# Patient Record
Sex: MALE | Race: WHITE | NOT HISPANIC OR LATINO | Employment: OTHER | URBAN - METROPOLITAN AREA
[De-identification: names, ages, dates, MRNs, and addresses within clinical notes are randomized per-mention and may not be internally consistent; named-entity substitution may affect disease eponyms.]

---

## 2020-01-03 ENCOUNTER — APPOINTMENT (OUTPATIENT)
Dept: RADIOLOGY | Facility: MEDICAL CENTER | Age: 20
End: 2020-01-03
Attending: EMERGENCY MEDICINE
Payer: MEDICARE

## 2020-01-03 ENCOUNTER — HOSPITAL ENCOUNTER (EMERGENCY)
Facility: MEDICAL CENTER | Age: 20
End: 2020-01-03
Attending: EMERGENCY MEDICINE
Payer: MEDICARE

## 2020-01-03 VITALS
HEIGHT: 71 IN | RESPIRATION RATE: 17 BRPM | SYSTOLIC BLOOD PRESSURE: 128 MMHG | TEMPERATURE: 98.8 F | WEIGHT: 123.46 LBS | OXYGEN SATURATION: 99 % | HEART RATE: 94 BPM | BODY MASS INDEX: 17.28 KG/M2 | DIASTOLIC BLOOD PRESSURE: 92 MMHG

## 2020-01-03 DIAGNOSIS — J02.8 PHARYNGITIS DUE TO OTHER ORGANISM: ICD-10-CM

## 2020-01-03 DIAGNOSIS — J03.90 TONSILLITIS: ICD-10-CM

## 2020-01-03 LAB
HETEROPH AB SER QL: NEGATIVE
S PYO DNA SPEC NAA+PROBE: NOT DETECTED

## 2020-01-03 PROCEDURE — 700117 HCHG RX CONTRAST REV CODE 255: Performed by: EMERGENCY MEDICINE

## 2020-01-03 PROCEDURE — 96375 TX/PRO/DX INJ NEW DRUG ADDON: CPT | Mod: XU

## 2020-01-03 PROCEDURE — 87651 STREP A DNA AMP PROBE: CPT

## 2020-01-03 PROCEDURE — 99284 EMERGENCY DEPT VISIT MOD MDM: CPT

## 2020-01-03 PROCEDURE — 87070 CULTURE OTHR SPECIMN AEROBIC: CPT

## 2020-01-03 PROCEDURE — 87081 CULTURE SCREEN ONLY: CPT

## 2020-01-03 PROCEDURE — 96365 THER/PROPH/DIAG IV INF INIT: CPT | Mod: XU

## 2020-01-03 PROCEDURE — 700105 HCHG RX REV CODE 258: Performed by: EMERGENCY MEDICINE

## 2020-01-03 PROCEDURE — 700111 HCHG RX REV CODE 636 W/ 250 OVERRIDE (IP): Performed by: EMERGENCY MEDICINE

## 2020-01-03 PROCEDURE — 86308 HETEROPHILE ANTIBODY SCREEN: CPT

## 2020-01-03 PROCEDURE — 70491 CT SOFT TISSUE NECK W/DYE: CPT

## 2020-01-03 RX ORDER — AMOXICILLIN 500 MG/1
1000 CAPSULE ORAL DAILY
Qty: 20 CAP | Refills: 0 | Status: SHIPPED | OUTPATIENT
Start: 2020-01-03 | End: 2020-01-13

## 2020-01-03 RX ORDER — KETOROLAC TROMETHAMINE 30 MG/ML
15 INJECTION, SOLUTION INTRAMUSCULAR; INTRAVENOUS ONCE
Status: COMPLETED | OUTPATIENT
Start: 2020-01-03 | End: 2020-01-03

## 2020-01-03 RX ORDER — LISDEXAMFETAMINE DIMESYLATE 60 MG/1
CAPSULE ORAL
Status: SHIPPED | COMMUNITY
End: 2021-10-12

## 2020-01-03 RX ORDER — DIVALPROEX SODIUM 500 MG/1
500 TABLET, DELAYED RELEASE ORAL DAILY
Status: SHIPPED | COMMUNITY
End: 2021-10-12

## 2020-01-03 RX ORDER — LEVOTHYROXINE SODIUM 88 UG/1
88 TABLET ORAL
Status: SHIPPED | COMMUNITY
End: 2021-10-12

## 2020-01-03 RX ORDER — DEXAMETHASONE SODIUM PHOSPHATE 4 MG/ML
8 INJECTION, SOLUTION INTRA-ARTICULAR; INTRALESIONAL; INTRAMUSCULAR; INTRAVENOUS; SOFT TISSUE ONCE
Status: COMPLETED | OUTPATIENT
Start: 2020-01-03 | End: 2020-01-03

## 2020-01-03 RX ORDER — SODIUM CHLORIDE 9 MG/ML
1000 INJECTION, SOLUTION INTRAVENOUS ONCE
Status: COMPLETED | OUTPATIENT
Start: 2020-01-03 | End: 2020-01-03

## 2020-01-03 RX ORDER — CLONIDINE HYDROCHLORIDE 0.1 MG/1
0.1 TABLET ORAL 2 TIMES DAILY
Status: SHIPPED | COMMUNITY
End: 2021-10-12

## 2020-01-03 RX ADMIN — DEXAMETHASONE SODIUM PHOSPHATE 8 MG: 4 INJECTION, SOLUTION INTRA-ARTICULAR; INTRALESIONAL; INTRAMUSCULAR; INTRAVENOUS; SOFT TISSUE at 12:52

## 2020-01-03 RX ADMIN — CEFTRIAXONE SODIUM 1 G: 1 INJECTION, POWDER, FOR SOLUTION INTRAMUSCULAR; INTRAVENOUS at 16:07

## 2020-01-03 RX ADMIN — IOHEXOL 75 ML: 350 INJECTION, SOLUTION INTRAVENOUS at 14:20

## 2020-01-03 RX ADMIN — SODIUM CHLORIDE 1000 ML: 9 INJECTION, SOLUTION INTRAVENOUS at 16:07

## 2020-01-03 RX ADMIN — KETOROLAC TROMETHAMINE 15 MG: 30 INJECTION, SOLUTION INTRAMUSCULAR at 12:52

## 2020-01-03 RX ADMIN — SODIUM CHLORIDE 1000 ML: 9 INJECTION, SOLUTION INTRAVENOUS at 12:51

## 2020-01-03 SDOH — HEALTH STABILITY: MENTAL HEALTH: HOW OFTEN DO YOU HAVE A DRINK CONTAINING ALCOHOL?: NOT ASKED

## 2020-01-03 ASSESSMENT — PAIN SCALES - WONG BAKER
WONGBAKER_NUMERICALRESPONSE: HURTS A LITTLE MORE
WONGBAKER_NUMERICALRESPONSE: HURTS A WHOLE LOT

## 2020-01-03 NOTE — ED PROVIDER NOTES
ED Provider Note    Scribed for Zen Davies D.O. by Dre Hoffman. 1/3/2020  12:14 PM    Primary care provider: None noted  Means of arrival: Private vehicle  History obtained from: Patient  History limited by: None    CHIEF COMPLAINT  Chief Complaint   Patient presents with   • Sent from Urgent Care     here to get r/o left peritonsillar abscess. also positive for influenza A   • Sore Throat     since sunday. also has painful swallowing       HPI  Chris Murillo is a 19 y.o. male who presents to the Emergency Department for evaluation of sore throat onset 5 days. He reports additional symptoms of difficulty swallowing, brown mucous production with a small amount of blood, but denies vomiting, or difficulty eating. [facility], who recommended he present to the ED to rule out possible peritonsillar abscesses. He was Flu A positive, but Strep negative at Urgent Care. He denies alleviating factors. He has history of ADHD and seizure disorder.     REVIEW OF SYSTEMS  Pertinent positives include difficulty swallowing, brown mucous production with a small amount of blood. Pertinent negatives include no vomiting, or difficulty eating. All other systems reviewed and negative.      PAST MEDICAL HISTORY  Past Medical History:   Diagnosis Date   • Psychiatric disorder     ADHD   • Seizure disorder (HCC)        SURGICAL HISTORY  History reviewed. No pertinent surgical history.     SOCIAL HISTORY  Social History     Tobacco Use   • Smoking status: Never Smoker   • Smokeless tobacco: Never Used   Substance Use Topics   • Alcohol use: Yes     Comment: rare   • Drug use: Yes     Comment: marijuana      Social History     Substance and Sexual Activity   Drug Use Yes    Comment: marijuana       FAMILY HISTORY  History reviewed. No pertinent family history.    CURRENT MEDICATIONS  Home Medications     Reviewed by Sharon Ray R.N. (Registered Nurse) on 01/03/20 at 1126  Med  "List Status: Complete   Medication Last Dose Status   artificial tears (EYE LUBRICANT) Ointment ophth ointment prn Active   cloNIDine (CATAPRES) 0.1 MG Tab taking Active   divalproex (DEPAKOTE) 500 MG Tablet Delayed Response taking Active   levothyroxine (SYNTHROID) 88 MCG Tab taking Active   Lisdexamfetamine Dimesylate (VYVANSE) 60 MG Cap taking Active                ALLERGIES  Allergies   Allergen Reactions   • Latex        PHYSICAL EXAM  VITAL SIGNS: /109   Pulse (!) 119   Temp 37.2 °C (99 °F) (Temporal)   Resp 18   Ht 1.803 m (5' 11\")   Wt 56 kg (123 lb 7.3 oz)   SpO2 100%   BMI 17.22 kg/m²   Nursing notes and vitals reviewed.  Constitutional: Well developed, Well nourished, No acute distress, Non-toxic appearance.   HENT: Significant peritonsillar edema and exudate, Pharyngeal erythema. Normocephalic, Atraumatic, tympanic membranes normal, moist mucous membranes, no rhinorrhea.  Eyes: PERRLA, EOMI, Conjunctiva normal, No discharge.   Cardiovascular: Tachycardic heart rate, Normal rhythm, No murmurs, No rubs, No gallops.   Thorax & Lungs: No respiratory distress, No rales, No rhonchi, No wheezing, No chest tenderness.   Abdomen: Bowel sounds normal, Soft, No tenderness, No guarding, No rebound, No masses, No pulsatile masses.   Skin: Warm, Dry, No erythema, No rash.   Musculoskeletal: Intact distal pulses, No edema, No cyanosis, No clubbing. Good range of motion in all major joints. No tenderness to palpation or major deformities noted, no CVA tenderness, no midline back tenderness.   Neurologic: Alert & oriented x 3, Normal motor function, Normal sensory function, No focal deficits noted.  Psychiatric: Affect normal for clinical presentation.     DIAGNOSTIC STUDIES/PROCEDURES    LABS  Results for orders placed or performed during the hospital encounter of 01/03/20   Group A Strep by PCR   Result Value Ref Range    Group A Strep by PCR Not Detected Not Detected   MONONUCLEOSIS TEST QUAL   Result " Value Ref Range    Heterophile Screen Negative Negative      All labs reviewed by me.    RADIOLOGY  CT-SOFT TISSUE NECK WITH   Final Result      1.  There is prominent tonsillar tissue with slightly heterogeneous enhancement suggesting tonsillitis but there is no focal abscess collection.   2.  There is bilateral neck lymphadenopathy, left greater than right, likely inflammatory and reactive.   3.  There is incidental underlying chronic sinus disease.        The radiologist's interpretation of all radiological studies have been reviewed by me.    COURSE & MEDICAL DECISION MAKING  Nursing notes, VS, PMSFHx reviewed in chart.    12:14 PM - Patient seen and examined at bedside. I informed the patient of my plan to run diagnostic studies to evaluate their symptoms including CT scan and labs. Patient verbalizes understanding and support with my plan of care.  Patient will be treated with NS Infusion 1,000 mL, Decadron 8 mg injection, Toradol 15 mg injection. Ordered CT-Soft Tissue Neck, Culture throat, Mononucleosis Test Qual, Group A Strep by PCR to evaluate his symptoms.      12:43 PM- Ordered Culture GC Screen     3:55 PM - The patient will be treated with NS Infusion 1,000 mL, and Rocephin 1 g in  mL IVPB.    HYDRATION: Based on the patient's presentation of Tachycardia the patient was given IV fluids. IV Hydration was used because oral hydration was not adequate alone. Upon recheck following hydration, the patient was slightly improved..  The patient did have cap refill less than 2 seconds, heart rate did decrease significantly, and moist mucous membranes.    1645- I reevaluated the patient at bedside. I discussed the patient's diagnostic study results which show no evidence of rapid strep or mono. The patient verbalizes they feel comfortable going home. The patient is stable for discharge at this time and will return for any new or worsening symptoms. I discussed plan for discharge and follow up as outlined  below. Patient verbalizes understanding and support with my plan for discharge.       This is a charming 19 y.o. male that presents with tonsillitis, pharyngitis and influenza A.  The patient is not a candidate for Tamiflu.  CT of the neck was completed was negative for abscess requiring surgical intervention.  Strep test was negative and mono test was negative as well.  Patient has significant exudate, evidence of infection therefore he received Unasyn initially.  Our concern for possible for gonorrhea as the patient does perform oral sex.  For this reason received ceftriaxone and a GC swab has been sent.  The patient will be given antibiotics and instructed take ibuprofen, Tylenol for pain, drink plenty fluids return to the emergency part for increasing symptomatology.  DISPOSITION:  Patient will be discharged home in stable condition.    FOLLOW UP:  [facility], Emergency Dept  [address] [place] [id number] [phone]    If symptoms worsen    Maritza Vidal M.D.  [address] [place] [id number]  [phone]    Schedule an appointment as soon as possible for a visit in 1 week  If symptoms worsen      OUTPATIENT MEDICATIONS:  New Prescriptions    AMOXICILLIN (AMOXIL) 500 MG CAP    Take 2 Caps by mouth every day for 10 days.       FINAL IMPRESSION  1. Pharyngitis due to other organism Active   2. Tonsillitis Active         Dre MANDEL (Janis), am scribing for, and in the presence of, Zen Davies D.O.    Electronically signed by: Dre Hoffman (Janis), 1/3/2020. Zen ROMERO D.O. personally performed the services described in this documentation, as scribed by Dre Hoffman in my presence, and it is both accurate and complete.    The note accurately reflects work and decisions made by me.  Zen Davies  1/3/2020  5:38 PM

## 2020-01-03 NOTE — ED TRIAGE NOTES
Chief Complaint   Patient presents with   • Sent from Urgent Care     here to get r/o left peritonsillar abscess. also positive for influenza A   • Sore Throat     since sunday. also has painful swallowing     Able to manage secretion. Educated on triage process. Instructed to notify staff for any worsening symptoms.

## 2020-01-05 LAB
BACTERIA SPEC RESP CULT: NORMAL
SIGNIFICANT IND 70042: NORMAL
SITE SITE: NORMAL
SOURCE SOURCE: NORMAL

## 2020-01-07 LAB
N GONORRHOEA SPEC QL CULT: NORMAL
SIGNIFICANT IND 70042: NORMAL
SITE SITE: NORMAL
SOURCE SOURCE: NORMAL

## 2021-01-15 ENCOUNTER — OFFICE VISIT (OUTPATIENT)
Dept: URGENT CARE | Facility: CLINIC | Age: 21
End: 2021-01-15
Payer: MEDICARE

## 2021-01-15 VITALS
TEMPERATURE: 97 F | WEIGHT: 130 LBS | DIASTOLIC BLOOD PRESSURE: 70 MMHG | SYSTOLIC BLOOD PRESSURE: 126 MMHG | HEIGHT: 72 IN | HEART RATE: 71 BPM | OXYGEN SATURATION: 96 % | RESPIRATION RATE: 16 BRPM | BODY MASS INDEX: 17.61 KG/M2

## 2021-01-15 DIAGNOSIS — B35.4 TINEA CORPORIS: ICD-10-CM

## 2021-01-15 DIAGNOSIS — B86 SCABIES: ICD-10-CM

## 2021-01-15 PROCEDURE — 99203 OFFICE O/P NEW LOW 30 MIN: CPT | Performed by: PHYSICIAN ASSISTANT

## 2021-01-15 RX ORDER — CLOTRIMAZOLE 1 %
CREAM (GRAM) TOPICAL
Qty: 24 G | Refills: 0 | Status: SHIPPED | OUTPATIENT
Start: 2021-01-15 | End: 2021-10-12

## 2021-01-15 RX ORDER — PERMETHRIN 50 MG/G
1 CREAM TOPICAL ONCE
Qty: 30 G | Refills: 0 | Status: SHIPPED | OUTPATIENT
Start: 2021-01-15 | End: 2021-01-15

## 2021-01-15 ASSESSMENT — ENCOUNTER SYMPTOMS
FEVER: 0
COUGH: 0
EYE REDNESS: 0
CLAUDICATION: 0
VOMITING: 0
MYALGIAS: 0
EYE DISCHARGE: 0
NAUSEA: 0
SHORTNESS OF BREATH: 0
HEADACHES: 0
SORE THROAT: 0

## 2021-01-15 NOTE — PROGRESS NOTES
Subjective:      Chris Murillo is a 20 y.o. male who presents with Rash (all over body, x1 week )        The patient presents to clinic complaining of a diffuse body rash x1 week.  The patient describes the rash as itchy.  The patient reports no new exposures to soaps, lotions, or detergents.  The patient also reports no associated fever.    Rash  This is a new problem. Episode onset: x 1 week ago. The problem is unchanged. The rash is diffuse. The rash is characterized by itchiness. He was exposed to nothing. Pertinent negatives include no congestion, cough, fever, shortness of breath, sore throat or vomiting. Past treatments include antihistamine. The treatment provided mild relief.     The patient reports no known exposure to COVID-19.    PMH:  has a past medical history of Psychiatric disorder and Seizure disorder (HCC).  MEDS:   Current Outpatient Medications:   •  cloNIDine (CATAPRES) 0.1 MG Tab, Take 0.1 mg by mouth 2 times a day., Disp: , Rfl:   •  divalproex (DEPAKOTE) 500 MG Tablet Delayed Response, Take 500 mg by mouth every day., Disp: , Rfl:   •  levothyroxine (SYNTHROID) 88 MCG Tab, Take 88 mcg by mouth Every morning on an empty stomach., Disp: , Rfl:   •  Lisdexamfetamine Dimesylate (VYVANSE) 60 MG Cap, Take  by mouth., Disp: , Rfl:   •  artificial tears (EYE LUBRICANT) Ointment ophth ointment, Place 1 Application in both eyes every 8 hours., Disp: , Rfl:   ALLERGIES:   Allergies   Allergen Reactions   • Latex      SURGHX: No past surgical history on file.  SOCHX:  reports that he has never smoked. He has never used smokeless tobacco. He reports current alcohol use. He reports current drug use.  FH: Family history was reviewed, no pertinent findings to report    The patient states he has been off of his prescribed medications for the past 2 months due to insurance issues.  The patient states he has not had any seizures during that time.    Review of Systems   Constitutional: Negative for fever.    HENT: Negative for congestion, ear pain and sore throat.    Eyes: Negative for discharge and redness.   Respiratory: Negative for cough and shortness of breath.    Cardiovascular: Negative for claudication.   Gastrointestinal: Negative for nausea and vomiting.   Musculoskeletal: Negative for myalgias.   Skin: Positive for rash.   Neurological: Negative for headaches.   All other systems reviewed and are negative.         Objective:     /70 (BP Location: Left arm, Patient Position: Sitting, BP Cuff Size: Adult)   Pulse 71   Temp 36.1 °C (97 °F) (Temporal)   Resp 16   Ht 1.829 m (6')   Wt 59 kg (130 lb)   SpO2 96%   BMI 17.63 kg/m²      Physical Exam  Constitutional:       General: He is not in acute distress.     Appearance: Normal appearance. He is well-developed. He is not ill-appearing.   HENT:      Head: Normocephalic and atraumatic.      Right Ear: External ear normal.      Left Ear: External ear normal.      Nose: Nose normal.      Mouth/Throat:      Mouth: Mucous membranes are moist.      Pharynx: Oropharynx is clear. No posterior oropharyngeal erythema.   Eyes:      Extraocular Movements: Extraocular movements intact.      Conjunctiva/sclera: Conjunctivae normal.   Neck:      Musculoskeletal: Normal range of motion and neck supple.   Cardiovascular:      Rate and Rhythm: Normal rate and regular rhythm.      Heart sounds: Normal heart sounds.   Pulmonary:      Effort: Pulmonary effort is normal. No respiratory distress.      Breath sounds: Normal breath sounds. No wheezing.   Musculoskeletal: Normal range of motion.   Skin:     General: Skin is warm and dry.      Findings: Rash present.      Comments:   Diffuse erythematous papules to the patient's chest, abdomen, back, and bilateral upper extremities including the web spacing in between the fingers and the dorsal aspect of the bilateral hands.  No involvement of the palms.  No tenderness palpation.  No swelling.  No surrounding erythema.  No  increased warmth.  No discharge/weeping.  No pustules.  No vesicles.  Well demarcated area of erythematous papules in a circular formation to the patient's anterior neck, consistent with tinea corporis.  No tenderness to palpation.  No swelling.  No surrounding erythema.  No increased warmth.  No discharge/weeping.  No pustules.  No vesicles.   Neurological:      Mental Status: He is alert and oriented to person, place, and time.                 Assessment/Plan:        1. Scabies  - permethrin (ELIMITE) 5 % Cream; Apply 1 Application topically one time for 1 dose. Thoroughly massage cream from head to soles of feet; leave on for 8 to 14 hours before removing (shower or bath).  Dispense: 30 g; Refill: 0    2. Tinea corporis  - clotrimazole (LOTRIMIN) 1 % Cream; Apply a small amount to the affected area twice daily x 7-10 days.  Dispense: 24 g; Refill: 0    The patient's presenting symptoms and physical exam findings are consistent with possible scabies.  On physical exam, the patient had diffuse erythematous papules to the chest, abdomen, back, and bilateral upper extremities including the web spacing in between the fingers and the dorsal aspect of the bilateral hands.  No involvement of the palms was appreciated.  No tenderness palpation, swelling, surrounding erythema, increased warmth, discharge/weeping, pustules, or vesicles were noted.  Will prescribe the patient permethrin cream for treatment of possible scabies.  Educated the patient on proper hygiene practices to prevent reinfestation.  Additionally, the patient also has a small area of tinea corporis on the anterior neck.  On physical exam, the patient had a well demarcated area of erythematous papules in a circular formation to the anterior neck, consistent with tinea corpus.  No tenderness palpation, swelling, surrounding erythema, increased warmth, discharge/weeping, pustules, or vesicles were appreciated.  Will prescribe the patient topical Lotrimin for  his acute fungal infection.  Advised the patient that the scabies and tinea corporis are unlikely related.  Based on the patient's presenting symptoms and physical exam findings, it is unlikely patient skin rash is secondarily infected.  Recommend OTC medications and supportive care for symptomatic management.  Recommend patient follow-up with his PCP.  Discussed strict return precautions with the patient, and he verbalized understanding.    Differential diagnoses, supportive care, and indications for immediate follow-up discussed with patient.   Instructed to return to clinic or nearest emergency department for any change in condition, further concerns, or worsening of symptoms.    OTC antihistamines for symptomatic relief  OTC antiitch creams for symptomatic relief  Monitor for secondary signs of infection  Hygiene practices as discussed in clinic  Follow-up with PCP  AVS printed  Return to clinic or go to the ED if symptoms worsen or fail to improve, or if the patient develop worsening/increasing/persistent skin rash, pain/tenderness to the affected area, swelling, increased redness or warmth, discharge/drainage, cough, congestion, ear pain, sore throat, shortness of breath, swelling of the face, lips, tongue, or throat, difficulty swallowing, difficulty breathing, fever/chills, secondary signs infection, and/or any concerning symptoms.    Discussed plan with the patient, and he agrees to the above.    Please note that this dictation was created using voice recognition software. I have made every reasonable attempt to correct obvious errors, but I expect that there may be errors of grammar and possibly content that I did not discover before finalizing the note.

## 2021-01-15 NOTE — PATIENT INSTRUCTIONS
Scabies, Adult    Scabies is a skin condition that happens when very small insects get under the skin (infestation). This causes a rash and severe itchiness. Scabies can spread from person to person (is contagious). If you get scabies, it is common for others in your household to get scabies too.  With proper treatment, symptoms usually go away in 2-4 weeks. Scabies usually does not cause lasting problems.  What are the causes?  This condition is caused by tiny mites (Sarcoptes scabiei, or human itch mites) that can only be seen with a microscope. The mites get into the top layer of skin and lay eggs. Scabies can spread from person to person through:  · Close contact with a person who has scabies.  · Sharing or having contact with infested items, such as towels, bedding, or clothing.  What increases the risk?  The following factors may make you more likely to develop this condition:  · Living in a nursing home or other extended care facility.  · Having sexual contact with a partner who has scabies.  · Caring for others who are at increased risk for scabies.  What are the signs or symptoms?  Symptoms of this condition include:  · Severe itchiness. This is often worse at night.  · A rash that includes tiny red bumps or blisters. The rash commonly occurs on the hands, wrists, elbows, armpits, chest, waist, groin, or buttocks. The bumps may form a line (burrow) in some areas.  · Skin irritation. This can include scaly patches or sores.  How is this diagnosed?  This condition may be diagnosed based on:  · A physical exam of the skin.  · A skin test. Your health care provider may take a sample of your affected skin (skin scraping) and have it examined under a microscope for signs of mites.  How is this treated?  This condition may be treated with:  · Medicated cream or lotion that kills the mites. This is spread on the entire body and left on for several hours. Usually, one treatment with medicated cream or lotion is  enough to kill all the mites. In severe cases, the treatment may need to be repeated.  · Medicated cream that relieves itching.  · Medicines taken by mouth (orally) that:  ? Relieve itching.  ? Reduce the swelling and redness.  ? Kill the mites. This treatment may be done in severe cases.  Follow these instructions at home:  Medicines    · Take or apply over-the-counter and prescription medicines as told by your health care provider.  · Apply medicated cream or lotion as told by your health care provider.  · Do not wash off the medicated cream or lotion until the necessary amount of time has passed.  Skin care    · Avoid scratching the affected areas of your skin.  · Keep your fingernails closely trimmed to reduce injury from scratching.  · Take cool baths or apply cool washcloths to your skin to help reduce itching.  General instructions  · Clean all items that you recently had contact with, including bedding, clothing, and furniture. Do this on the same day that you start treatment.  ? Dry clean items, or use hot water to wash items. Dry items on the hot dry cycle.  ? Place items that cannot be washed into closed, airtight plastic bags for at least 3 days. The mites cannot live for more than 3 days away from human skin.  ? Vacuum furniture and mattresses that you use.  · Make sure that other people who may have been infested are examined by a health care provider. These include members of your household and anyone who may have had contact with infested items.  · Keep all follow-up visits as told by your health care provider. This is important.  Contact a health care provider if:  · You have itching that does not go away after 4 weeks of treatment.  · You continue to develop new bumps or burrows.  · You have redness, swelling, or pain in your rash area after treatment.  · You have fluid, blood, or pus coming from your rash.  Summary  · Scabies is a skin condition that causes a rash and severe itchiness.  · This  condition is caused by tiny mites that get into the top layer of the skin and lay eggs.  · Scabies can spread from person to person.  · Follow treatments as recommended by your health care provider.  · Clean all items that you recently had contact with.  This information is not intended to replace advice given to you by your health care provider. Make sure you discuss any questions you have with your health care provider.  Document Released: 09/07/2016 Document Revised: 10/23/2019 Document Reviewed: 10/23/2019  Elsevier Patient Education © 2020 [employer]      Body Ringworm  Body ringworm is an infection of the skin that often causes a ring-shaped rash. Body ringworm is also called tinea corporis.  Body ringworm can affect any part of your skin. This condition is easily spread from person to person (is very contagious).  What are the causes?  This condition is caused by fungi called dermatophytes. The condition develops when these fungi grow out of control on the skin.  You can get this condition if you touch a person or animal that has it. You can also get it if you share any items with an infected person or pet. These include:  · Clothing, bedding, and towels.  · Brushes or odonnell.  · Gym equipment.  · Any other object that has the fungus on it.  What increases the risk?  You are more likely to develop this condition if you:  · Play sports that involve close physical contact, such as wrestling.  · Sweat a lot.  · Live in areas that are hot and humid.  · Use public showers.  · Have a weakened immune system.  What are the signs or symptoms?  Symptoms of this condition include:  · Itchy, raised red spots and bumps.  · Red scaly patches.  · A ring-shaped rash. The rash may have:  ? A clear center.  ? Scales or red bumps at its center.  ? Redness near its borders.  ? Dry and scaly skin on or around it.  How is this diagnosed?  This condition can usually be diagnosed with a skin exam. A skin scraping may be taken from  the affected area and examined under a microscope to see if the fungus is present.  How is this treated?  This condition may be treated with:  · An antifungal cream or ointment.  · An antifungal shampoo.  · Antifungal medicines. These may be prescribed if your ringworm:  ? Is severe.  ? Keeps coming back.  ? Lasts a long time.  Follow these instructions at home:  · Take over-the-counter and prescription medicines only as told by your health care provider.  · If you were given an antifungal cream or ointment:  ? Use it as told by your health care provider.  ? Wash the infected area and dry it completely before applying the cream or ointment.  · If you were given an antifungal shampoo:  ? Use it as told by your health care provider.  ? Leave the shampoo on your body for 3-5 minutes before rinsing.  · While you have a rash:  ? Wear loose clothing to stop clothes from rubbing and irritating it.  ? Wash or change your bed sheets every night.  ? Disinfect or throw out items that may be infected.  ? Wash clothes and bed sheets in hot water.  ? Wash your hands often with soap and water. If soap and water are not available, use hand .  · If your pet has the same infection, take your pet to see a  for treatment.  How is this prevented?  · Take a bath or shower every day and after every time you work out or play sports.  · Dry your skin completely after bathing.  · Wear sandals or shoes in public places and showers.  · Change your clothes every day.  · Wash athletic clothes after each use.  · Do not share personal items with others.  · Avoid touching red patches of skin on other people.  · Avoid touching pets that have bald spots.  · If you touch an animal that has a bald spot, wash your hands.  Contact a health care provider if:  · Your rash continues to spread after 7 days of treatment.  · Your rash is not gone in 4 weeks.  · The area around your rash gets red, warm, tender, and swollen.  Summary  · Body  ringworm is an infection of the skin that often causes a ring-shaped rash.  · This condition is easily spread from person to person (is very contagious).  · This condition may be treated with antifungal cream or ointment, antifungal shampoo, or antifungal medicines.  · Take over-the-counter and prescription medicines only as told by your health care provider.  This information is not intended to replace advice given to you by your health care provider. Make sure you discuss any questions you have with your health care provider.  Document Released: 12/15/2001 Document Revised: 08/16/2019 Document Reviewed: 08/16/2019  Elsevier Patient Education © 2020 [employer]

## 2021-01-19 ENCOUNTER — OFFICE VISIT (OUTPATIENT)
Dept: URGENT CARE | Facility: CLINIC | Age: 21
End: 2021-01-19
Payer: MEDICARE

## 2021-01-19 VITALS
OXYGEN SATURATION: 95 % | RESPIRATION RATE: 14 BRPM | SYSTOLIC BLOOD PRESSURE: 118 MMHG | BODY MASS INDEX: 17.61 KG/M2 | WEIGHT: 130 LBS | HEART RATE: 97 BPM | DIASTOLIC BLOOD PRESSURE: 72 MMHG | HEIGHT: 72 IN | TEMPERATURE: 96.8 F

## 2021-01-19 DIAGNOSIS — R21 RASH AND NONSPECIFIC SKIN ERUPTION: ICD-10-CM

## 2021-01-19 PROCEDURE — 99213 OFFICE O/P EST LOW 20 MIN: CPT | Performed by: PHYSICIAN ASSISTANT

## 2021-01-19 RX ORDER — IVERMECTIN 3 MG/1
200 TABLET ORAL ONCE
Qty: 4 TAB | Refills: 0 | Status: SHIPPED | OUTPATIENT
Start: 2021-01-19 | End: 2021-01-19

## 2021-01-19 RX ORDER — METHYLPREDNISOLONE 4 MG/1
TABLET ORAL
Qty: 21 TAB | Refills: 0 | Status: SHIPPED | OUTPATIENT
Start: 2021-01-19 | End: 2021-10-12

## 2021-01-19 ASSESSMENT — ENCOUNTER SYMPTOMS
DIARRHEA: 0
ABDOMINAL PAIN: 0
RHINORRHEA: 0
WHEEZING: 0
CHILLS: 0
NAUSEA: 0
PALPITATIONS: 0
FATIGUE: 0
ANOREXIA: 0
BRUISES/BLEEDS EASILY: 0
VOMITING: 0
FEVER: 0
SHORTNESS OF BREATH: 0
COUGH: 0
HEADACHES: 0
SORE THROAT: 0
MYALGIAS: 0

## 2021-01-20 NOTE — PROGRESS NOTES
Subjective:      Chris Murillo is a 20 y.o. male who presents with Other (already has scabies just wants more of the treatment)            Rash  This is a new problem. The current episode started 1 to 4 weeks ago (1.5 weeks ). The problem has been gradually worsening since onset. The rash is diffuse. The rash is characterized by itchiness and redness. It is unknown if there was an exposure to a precipitant. Pertinent negatives include no anorexia, congestion, cough, diarrhea, facial edema, fatigue, fever, joint pain, rhinorrhea, shortness of breath, sore throat or vomiting. Treatments tried: Permethrin cream x 1. The treatment provided no relief.       Past Medical History:   Diagnosis Date   • Psychiatric disorder     ADHD   • Seizure disorder (HCC)        No past surgical history on file.    No family history on file.    Allergies   Allergen Reactions   • Latex        Medications, Allergies, and current problem list reviewed today in Epic    Review of Systems   Constitutional: Negative for chills, fatigue, fever and malaise/fatigue.   HENT: Negative for congestion, rhinorrhea and sore throat.    Respiratory: Negative for cough, shortness of breath and wheezing.    Cardiovascular: Negative for chest pain, palpitations and leg swelling.   Gastrointestinal: Negative for abdominal pain, anorexia, diarrhea, nausea and vomiting.   Musculoskeletal: Negative for joint pain and myalgias.   Skin: Positive for itching and rash.   Neurological: Negative for headaches.   Endo/Heme/Allergies: Does not bruise/bleed easily.       All other systems reviewed and are negative.        Objective:     /72   Pulse 97   Temp 36 °C (96.8 °F) (Temporal)   Resp 14   Ht 1.829 m (6')   Wt 59 kg (130 lb)   SpO2 95%   BMI 17.63 kg/m²      Physical Exam  Constitutional:       General: He is not in acute distress.     Appearance: He is not ill-appearing.   HENT:      Head: Normocephalic and atraumatic.   Eyes:       Conjunctiva/sclera: Conjunctivae normal.   Cardiovascular:      Rate and Rhythm: Normal rate.   Pulmonary:      Effort: Pulmonary effort is normal. No respiratory distress.      Breath sounds: No stridor. No wheezing.   Musculoskeletal: Normal range of motion.   Skin:     General: Skin is warm and dry.      Findings: Rash present. Rash is papular (diffuse papular rash. Small rash noted in right interdigital webbing ). Rash is not crusting.   Neurological:      General: No focal deficit present.      Mental Status: He is alert and oriented to person, place, and time.   Psychiatric:         Mood and Affect: Mood normal.         Behavior: Behavior normal.         Thought Content: Thought content normal.         Judgment: Judgment normal.                 Assessment/Plan:        1. Rash and nonspecific skin eruption    Possible scabies vs dermatitis.  - Ivermectin 3 MG Tab; Take 12 mg by mouth one time for 1 dose.  Dispense: 4 Tab; Refill: 0  - methylPREDNISolone (MEDROL DOSEPAK) 4 MG Tablet Therapy Pack; Follow schedule on package instructions.  Dispense: 21 Tab; Refill: 0    Differential diagnoses, Supportive care, and fdications for immediate follow-up discussed with patient.   Pathogenesis of diagnosis discussed including typical length and natural progression.   Instructed to return to clinic or nearest emergency department for any change in condition, further concerns, or worsening of symptoms.    The patient demonstrated a good understanding and agreed with the treatment plan.    Syeda Willett P.A.-C.

## 2021-02-02 ENCOUNTER — HOSPITAL ENCOUNTER (EMERGENCY)
Facility: MEDICAL CENTER | Age: 21
End: 2021-02-02
Attending: EMERGENCY MEDICINE
Payer: MEDICARE

## 2021-02-02 VITALS
OXYGEN SATURATION: 96 % | HEART RATE: 100 BPM | RESPIRATION RATE: 16 BRPM | SYSTOLIC BLOOD PRESSURE: 138 MMHG | BODY MASS INDEX: 19.26 KG/M2 | DIASTOLIC BLOOD PRESSURE: 93 MMHG | WEIGHT: 142.2 LBS | TEMPERATURE: 97.8 F | HEIGHT: 72 IN

## 2021-02-02 DIAGNOSIS — F12.90 MARIJUANA USE: ICD-10-CM

## 2021-02-02 DIAGNOSIS — R21 RASH: ICD-10-CM

## 2021-02-02 PROCEDURE — 99282 EMERGENCY DEPT VISIT SF MDM: CPT

## 2021-02-02 RX ORDER — HYDROXYZINE HYDROCHLORIDE 25 MG/1
25 TABLET, FILM COATED ORAL 3 TIMES DAILY PRN
Qty: 30 TAB | Refills: 0 | Status: SHIPPED | OUTPATIENT
Start: 2021-02-02 | End: 2021-10-12

## 2021-02-02 RX ORDER — PREDNISONE 20 MG/1
60 TABLET ORAL DAILY
Qty: 15 TAB | Refills: 0 | Status: SHIPPED | OUTPATIENT
Start: 2021-02-02 | End: 2021-02-07

## 2021-02-02 RX ORDER — PERMETHRIN 50 MG/G
CREAM TOPICAL
Qty: 30 G | Refills: 0 | Status: SHIPPED | OUTPATIENT
Start: 2021-02-02 | End: 2021-10-12

## 2021-02-02 NOTE — ED TRIAGE NOTES
Chris Juan Pablo Murillo 21 y.o. male ambulatory to triage for     Chief Complaint   Patient presents with   • Rash     x 3 weeks, scattered over entire body, itchy per pt report     Pt seen at  on 1/15 and told he had scabies, given permethrin and lotramin w/ no relief.  Pt seen at  again on 1/19 and prescribed ivermectin and a medrol dosepak.  Pt reports still no relief.  Pt in NAD.  VSS.  /85   Pulse (!) 101   Temp 36.6 °C (97.8 °F) (Tympanic)   Resp 16   Ht 1.829 m (6')   Wt 64.5 kg (142 lb 3.2 oz)   SpO2 96%   BMI 19.29 kg/m²

## 2021-02-02 NOTE — ED NOTES
All DC instructions for Rash. He is aware he needs to go through primary care for referral to dermatology. 3 written prescriptions with pt at time of DC. Pt will follow up with derm and primary care as needed.

## 2021-02-02 NOTE — ED PROVIDER NOTES
ED Provider Note  ER PROVIDER NOTE        CHIEF COMPLAINT  Chief Complaint   Patient presents with   • Rash     x 3 weeks, scattered over entire body, itchy per pt report       HPI  Chris Murillo is a 21 y.o. male who presents to the emergency department complaining of rash.  Patient reports that he has had a rash of the last 3 weeks.  It is pruritic not painful.  He reports that it seemed to start on his legs and back first as well as his forearms, it has spread somewhat from those areas.  He was seen on the 15th, prescribed permethrin which did not change anything, then was seen again by his primary care tried ivermectin without relief.  He reports no known new exposures, soaps, lotions, foods or otherwise.  No fevers chills cough congestion or other recent illness.    REVIEW OF SYSTEMS  Pertinent positives include rash. Pertinent negatives include no fever or cough. See HPI for details. All other systems reviewed and are negative.    PAST MEDICAL HISTORY   has a past medical history of Psychiatric disorder and Seizure disorder (HCC).    SURGICAL HISTORY  patient denies any surgical history    FAMILY HISTORY  No family history on file.    SOCIAL HISTORY  Social History     Socioeconomic History   • Marital status: Single     Spouse name: Not on file   • Number of children: Not on file   • Years of education: Not on file   • Highest education level: Not on file   Occupational History   • Not on file   Social Needs   • Financial resource strain: Not on file   • Food insecurity     Worry: Not on file     Inability: Not on file   • Transportation needs     Medical: Not on file     Non-medical: Not on file   Tobacco Use   • Smoking status: Never Smoker   • Smokeless tobacco: Never Used   Substance and Sexual Activity   • Alcohol use: Yes     Comment: rare   • Drug use: Yes     Comment: marijuana   • Sexual activity: Not on file   Lifestyle   • Physical activity     Days per week: Not on file     Minutes per  session: Not on file   • Stress: Not on file   Relationships   • Social connections     Talks on phone: Not on file     Gets together: Not on file     Attends Spiritism service: Not on file     Active member of club or organization: Not on file     Attends meetings of clubs or organizations: Not on file     Relationship status: Not on file   • Intimate partner violence     Fear of current or ex partner: Not on file     Emotionally abused: Not on file     Physically abused: Not on file     Forced sexual activity: Not on file   Other Topics Concern   • Not on file   Social History Narrative   • Not on file      Social History     Substance and Sexual Activity   Drug Use Yes    Comment: marijuana       CURRENT MEDICATIONS  Home Medications    **Home medications have not yet been reviewed for this encounter**         ALLERGIES  Allergies   Allergen Reactions   • Latex        PHYSICAL EXAM  VITAL SIGNS: /93   Pulse 100   Temp 36.6 °C (97.8 °F) (Temporal)   Resp 16   Ht 1.829 m (6')   Wt 64.5 kg (142 lb 3.2 oz)   SpO2 96%   BMI 19.29 kg/m²   Pulse ox interpretation: I interpret this pulse ox as normal.    Constitutional: Alert in no apparent distress.  HENT: No signs of trauma, Bilateral external ears normal, Nose normal.   Eyes: Pupils are equal and reactive, Conjunctiva normal, Non-icteric.   Neck: Normal range of motion, No tenderness, Supple, No stridor.   Lymphatic: No lymphadenopathy noted.   Cardiovascular: Regular rate and rhythm, no murmurs.   Thorax & Lungs: Normal breath sounds, No respiratory distress, No wheezing, No chest tenderness.   Abdomen: Bowel sounds normal, Soft, No tenderness, No masses, No pulsatile masses. No peritoneal signs.  Skin: Warm, Dry, No erythema, patient has fairly diffuse rash across his forearms lower legs, thighs, back and his torso, it is papular in nature, with some areas of fairly extensive confluence.  There is no skin breakdown or sloughing or crusting.  There are  no lesions on either the palmar aspect of the dorsal aspect or interdigital spaces of his hands.  No facial lesions  Back: No bony tenderness, No CVA tenderness.   Extremities: Intact distal pulses, No edema, No tenderness, No cyanosis, Negative Andrew's sign.  Musculoskeletal: Good range of motion in all major joints. No tenderness to palpation or major deformities noted.   Neurologic: Alert , Normal motor function, Normal sensory function, No focal deficits noted.   Psychiatric: Affect normal, Judgment normal, Mood normal.     DIAGNOSTIC STUDIES / PROCEDURES        RADIOLOGY  No orders to display     The radiologist's interpretation of all radiological studies have been reviewed and images independently viewed by me.    COURSE & MEDICAL DECISION MAKING  Nursing notes, VS, PMSFHx reviewed in chart.    1:30 PM Patient seen and examined at bedside.  Will provide hydroxyzine, steroid and patient is comfortable with discharge          Decision Making:  This is a 21 y.o. male present with rash.  There are some components on his forearms that seem consistent with scabies the more confluent areas and lack of interdigital symptoms make this seem less likely.  Additionally he has been treated with both permethrin and invermectin without relief.  There does not appear to be any toxic cause for his rash or findings suggestive of Cazares-Glenn or infectious cause.  At this point will provide symptomatic relief with a steroid burst as well as hydroxyzine.  He has talked with his primary care who is arrange for dermatology follow-up     The patient will return for new or worsening symptoms and is stable at the time of discharge.    The patient is referred to a primary physician for blood pressure management, diabetic screening, and for all other preventative health concerns.        DISPOSITION:  Patient will be discharged home in stable condition.    FOLLOW UP:  With dermatology as referred from your primary  care            OUTPATIENT MEDICATIONS:  Discharge Medication List as of 2/2/2021  1:55 PM      START taking these medications    Details   predniSONE (DELTASONE) 20 MG Tab Take 3 Tabs by mouth every day for 5 days., Disp-15 Tab, R-0, Print Rx Paper      hydrOXYzine HCl (ATARAX) 25 MG Tab Take 1 Tab by mouth 3 times a day as needed for Itching., Disp-30 Tab, R-0, Print Rx Paper               FINAL IMPRESSION  1. Rash    2. Marijuana use         The note accurately reflects work and decisions made by me.  Andrés Gunn M.D.  2/2/2021  2:33 PM

## 2021-03-19 ENCOUNTER — HOSPITAL ENCOUNTER (EMERGENCY)
Facility: MEDICAL CENTER | Age: 21
End: 2021-03-19
Attending: EMERGENCY MEDICINE
Payer: MEDICARE

## 2021-03-19 VITALS
HEIGHT: 72 IN | DIASTOLIC BLOOD PRESSURE: 88 MMHG | TEMPERATURE: 97 F | HEART RATE: 99 BPM | SYSTOLIC BLOOD PRESSURE: 128 MMHG | OXYGEN SATURATION: 98 % | BODY MASS INDEX: 18.69 KG/M2 | WEIGHT: 138.01 LBS | RESPIRATION RATE: 18 BRPM

## 2021-03-19 DIAGNOSIS — M54.50 ACUTE RIGHT-SIDED LOW BACK PAIN WITHOUT SCIATICA: ICD-10-CM

## 2021-03-19 DIAGNOSIS — S39.012A LUMBOSACRAL STRAIN, INITIAL ENCOUNTER: ICD-10-CM

## 2021-03-19 PROCEDURE — 700111 HCHG RX REV CODE 636 W/ 250 OVERRIDE (IP): Performed by: EMERGENCY MEDICINE

## 2021-03-19 PROCEDURE — A9270 NON-COVERED ITEM OR SERVICE: HCPCS | Performed by: EMERGENCY MEDICINE

## 2021-03-19 PROCEDURE — 99283 EMERGENCY DEPT VISIT LOW MDM: CPT

## 2021-03-19 PROCEDURE — 700102 HCHG RX REV CODE 250 W/ 637 OVERRIDE(OP): Performed by: EMERGENCY MEDICINE

## 2021-03-19 PROCEDURE — 96372 THER/PROPH/DIAG INJ SC/IM: CPT

## 2021-03-19 RX ORDER — METHOCARBAMOL 500 MG/1
500 TABLET, FILM COATED ORAL ONCE
Status: COMPLETED | OUTPATIENT
Start: 2021-03-19 | End: 2021-03-19

## 2021-03-19 RX ORDER — KETOROLAC TROMETHAMINE 30 MG/ML
30 INJECTION, SOLUTION INTRAMUSCULAR; INTRAVENOUS ONCE
Status: COMPLETED | OUTPATIENT
Start: 2021-03-19 | End: 2021-03-19

## 2021-03-19 RX ORDER — METHOCARBAMOL 500 MG/1
500 TABLET, FILM COATED ORAL EVERY 6 HOURS PRN
Qty: 20 TABLET | Refills: 0 | Status: SHIPPED | OUTPATIENT
Start: 2021-03-19 | End: 2021-10-12

## 2021-03-19 RX ORDER — IBUPROFEN 600 MG/1
600 TABLET ORAL EVERY 6 HOURS PRN
Qty: 20 TABLET | Refills: 0 | Status: SHIPPED | OUTPATIENT
Start: 2021-03-19 | End: 2021-10-12

## 2021-03-19 RX ADMIN — METHOCARBAMOL 500 MG: 500 TABLET ORAL at 10:08

## 2021-03-19 RX ADMIN — KETOROLAC TROMETHAMINE 30 MG: 30 INJECTION, SOLUTION INTRAMUSCULAR at 10:07

## 2021-03-19 ASSESSMENT — PAIN DESCRIPTION - PAIN TYPE: TYPE: ACUTE PAIN

## 2021-03-19 NOTE — ED TRIAGE NOTES
"..  Chief Complaint   Patient presents with   • Low Back Pain     c/o low back pain x's 1 day. pt reports mainly right sided pain stating he feels a \"little bump\" on lower back.          /95   Pulse (!) 113   Temp 35.9 °C (96.7 °F) (Temporal)   Resp 16   Ht 1.829 m (6')   Wt 62.6 kg (138 lb 0.1 oz)   SpO2 97%        Explained triage process, to waiting room. Asked to inform RN if questions or concerns arise.           "

## 2021-03-19 NOTE — DISCHARGE INSTRUCTIONS
Follow-up with primary care early next week for reevaluation, medication management, close by pressure monitoring and referral for additional imaging/MRI, physical therapy or specialty evaluation of pain persists.    Motrin every six hours as needed for discomfort.  Robaxin every six hours as needed for pain or spasm.    Slow stretching range of motion exercises encouraged. Otherwise activity is tolerated.    Return the emergency department for persistent or worsening back pain, lower extremity weakness of paresthesias, incontinence or urinary tension, abdominal pain, vomiting, fever or other new concerns.

## 2021-03-19 NOTE — ED PROVIDER NOTES
"ED Provider Note    CHIEF COMPLAINT  Chief Complaint   Patient presents with   • Low Back Pain     c/o low back pain x's 1 day. pt reports mainly right sided pain stating he feels a \"little bump\" on lower back.        HPI  Chris Murillo is a 21 y.o. male who presents to the emergency department through triage for low back pain. Patient states he got up in the night to use the bathroom, seated when he went to stand up he felt a pull in his right low back. Constant, tightness since that time. Worse with certain movements. No flank pain or abdominal pain. No scrotal pain. No difficulty or pain with urination. No lower extremity weakness or paresthesias. No incontinence or urinary retention. No history of similar symptoms. No medications for discomfort prior to arrival.    REVIEW OF SYSTEMS  See HPI for further details. All other systems are negative.     PAST MEDICAL HISTORY   has a past medical history of Psychiatric disorder and Seizure disorder (HCC).    SOCIAL HISTORY  Social History     Tobacco Use   • Smoking status: Never Smoker   • Smokeless tobacco: Never Used   Substance and Sexual Activity   • Alcohol use: Yes     Comment: rare   • Drug use: Yes     Comment: marijuana   • Sexual activity: Not on file       SURGICAL HISTORY  patient denies any surgical history    CURRENT MEDICATIONS  Home Medications     Reviewed by Olinda Roldan R.N. (Registered Nurse) on 03/19/21 at 0815  Med List Status: Not Addressed   Medication Last Dose Status   artificial tears (EYE LUBRICANT) Ointment ophth ointment  Active   cloNIDine (CATAPRES) 0.1 MG Tab  Active   clotrimazole (LOTRIMIN) 1 % Cream  Active   divalproex (DEPAKOTE) 500 MG Tablet Delayed Response  Active   hydrOXYzine HCl (ATARAX) 25 MG Tab  Active   levothyroxine (SYNTHROID) 88 MCG Tab  Active   Lisdexamfetamine Dimesylate (VYVANSE) 60 MG Cap  Active   methylPREDNISolone (MEDROL DOSEPAK) 4 MG Tablet Therapy Pack  Active   permethrin (ELIMITE) 5 % Cream  Active "                ALLERGIES  Allergies   Allergen Reactions   • Latex        PHYSICAL EXAM  VITAL SIGNS: /95   Pulse (!) 113   Temp 35.9 °C (96.7 °F) (Temporal)   Resp 16   Ht 1.829 m (6')   Wt 62.6 kg (138 lb 0.1 oz)   SpO2 97%   BMI 18.72 kg/m²   Pulse ox interpretation: I interpret this pulse ox as normal.  Constitutional: Alert in no apparent distress.  HENT: Normocephalic, atraumatic. Bilateral external ears normal, Nose normal.  Eyes: Pupils are equal and reactive, Conjunctiva normal.   Neck: Normal range of motion, Supple  Lymphatic: No lymphadenopathy noted.   Cardiovascular: Regular rate and rhythm, no murmurs. Distal pulses intact.  No peripheral edema.  Thorax & Lungs: Normal breath sounds.  No wheezing/rales/ronchi. No increased work of breathing, clipped speech or retractions.   Abdomen: Soft, non-distended, non-tender to palpation. No palpable or pulsatile masses. No peritoneal signs. No CVA tenderness.  : no inguinal master hernia. She descended testicles. No rash cellulitis.  Skin: Warm, Dry, No erythema, No rash.   Musculoskeletal: Good range of motion in all major joints.   Neurologic: Alert 9×4. Ambulate independently. Identify strength bilateral lower extremities with proximal and distal muscle groups. 2+ patellar DTR. Sensation - like touch, no saddle anesthesia. Straight leg raise intact but with discomfort right greater than left bilaterally.  Psychiatric: Affect normal, Judgment normal, Mood normal.       COURSE & MEDICAL DECISION MAKING  Nursing notes and vital signs were reviewed. (See chart for details)  The patients records were reviewed, history was obtained from the patient ;     Evaluation was consistent with acute lumbosacral strain. Patient is neurologically intact and nonfocal otherwise. There is no history of clinical evidence to suggest, Kleiner, epidural abscess or other spinal cord compression syndrome .abdominal exam is benign. Symptomatology is atypical for  ureteral colic. Hemodynamically stable, no fever or hypertension. Tachycardia improved with pain control. Pain is controlled with Toradol and Robaxin. Patient ambulates independently before discharge.    Patient is stable for discharge at this time, anticipatory guidance provided, motion Robaxin as needed for pain or spasm, close follow-up is encouraged, and strict ED return instructions have been detailed. Patient is agreeable to the disposition and plan.    Patient's blood pressure was elevated in the emergency department, and has been referred to primary care for close monitoring.    FINAL IMPRESSION  (M54.5) Acute right-sided low back pain without sciatica  (S39.012A) Lumbosacral strain, initial encounter      Electronically signed by: Radha Baez D.O., 3/19/2021 9:15 AM      This dictation was created using voice recognition software. The accuracy of the dictation is limited to the abilities of the software. I expect there may be some errors of grammar and possibly content. The nursing notes were reviewed and certain aspects of this information were incorporated into this note.

## 2021-03-19 NOTE — ED NOTES
Pt able to ambulate to [id number]. Pt states he was using the restroom and stood up and had sudden onset of sharp, burning, shooting pain. That has not stopped since. No other hx of back injury or issues. Pt denies numbness or tingling or loss of bowel or bladder

## 2021-10-12 ENCOUNTER — OFFICE VISIT (OUTPATIENT)
Dept: MEDICAL GROUP | Facility: CLINIC | Age: 21
End: 2021-10-12
Payer: MEDICARE

## 2021-10-12 VITALS
OXYGEN SATURATION: 95 % | HEIGHT: 72 IN | HEART RATE: 97 BPM | SYSTOLIC BLOOD PRESSURE: 126 MMHG | RESPIRATION RATE: 12 BRPM | BODY MASS INDEX: 18.96 KG/M2 | DIASTOLIC BLOOD PRESSURE: 77 MMHG | WEIGHT: 140 LBS

## 2021-10-12 DIAGNOSIS — Z91.89 AT RISK FOR HIV DUE TO HOMOSEXUAL CONTACT: ICD-10-CM

## 2021-10-12 DIAGNOSIS — A63.0 ANAL WARTS: ICD-10-CM

## 2021-10-12 PROCEDURE — 99213 OFFICE O/P EST LOW 20 MIN: CPT | Mod: GE | Performed by: STUDENT IN AN ORGANIZED HEALTH CARE EDUCATION/TRAINING PROGRAM

## 2021-10-12 RX ORDER — IMIQUIMOD 12.5 MG/.25G
1 CREAM TOPICAL
Qty: 32 EACH | Refills: 0 | Status: SHIPPED | OUTPATIENT
Start: 2021-10-12 | End: 2021-12-14 | Stop reason: SDUPTHER

## 2021-10-12 ASSESSMENT — PATIENT HEALTH QUESTIONNAIRE - PHQ9: CLINICAL INTERPRETATION OF PHQ2 SCORE: 0

## 2021-10-12 NOTE — PROGRESS NOTES
Subjective:     CC: Possible referral    HPI:   Chris is a 21 y.o. male who presents today for the following problems:    Problem   Anal Warts    -Patient has been dealing with anal warts for several months  -These are little irritating to him as he feels it when he wipes, and has some itching.  -He has had 2 treatments of cryotherapy  -This treatments typically help for couple weeks, however, the warts come back soon after  -He is interested in getting a referral or some other treatment at this time  -He has not tried any topical therapies at this time     At Risk for HIV Due to Homosexual Contact    -He reports that he was tested for HIV a couple months ago and tested negative  -He denies ever testing positive for HIV         Current Outpatient Medications Ordered in Epic   Medication Sig Dispense Refill   • imiquimod (ALDARA) 5 % cream Apply 1 Packet topically two times a week. 32 Each 0   • ibuprofen (MOTRIN) 600 MG Tab Take 1 tablet by mouth every 6 hours as needed. (Patient not taking: Reported on 10/12/2021) 20 tablet 0   • methocarbamol (ROBAXIN) 500 MG Tab Take 1 tablet by mouth every 6 hours as needed. (Patient not taking: Reported on 10/12/2021) 20 tablet 0   • hydrOXYzine HCl (ATARAX) 25 MG Tab Take 1 Tab by mouth 3 times a day as needed for Itching. (Patient not taking: Reported on 10/12/2021) 30 Tab 0   • permethrin (ELIMITE) 5 % Cream Apply to affected areas topically from head to toe, leave on overnight, and wash off in the morning. (Patient not taking: Reported on 10/12/2021) 30 g 0   • methylPREDNISolone (MEDROL DOSEPAK) 4 MG Tablet Therapy Pack Follow schedule on package instructions. (Patient not taking: Reported on 10/12/2021) 21 Tab 0   • clotrimazole (LOTRIMIN) 1 % Cream Apply a small amount to the affected area twice daily x 7-10 days. (Patient not taking: Reported on 1/19/2021) 24 g 0     No current Epic-ordered facility-administered medications on file.       ROS:  See HPI      Objective:      Exam:  /77 (BP Location: Right arm, Patient Position: Sitting, BP Cuff Size: Adult)   Pulse 97   Resp 12   Ht 1.829 m (6')   Wt 63.5 kg (140 lb)   SpO2 95%   BMI 18.99 kg/m²  Body mass index is 18.99 kg/m².    Gen: Alert and oriented, No apparent distress.  Neck: Neck is supple without lymphadenopathy.  Ext: No clubbing, cyanosis, edema.      Assessment & Plan:     21 y.o. male with the following -     Problem List Items Addressed This Visit     Anal warts     -He is agreeable to trying imiquimod  -Imiquimod 1 packet applied twice weekly for 16 weeks  -He will follow-up after that treatment is complete and will consider any further steps at that time         At risk for HIV due to homosexual contact     -He should test regularly for HIV, especially when having new partners or engaging in high risk sexual activity             Please note that this dictation was created using voice recognition software. I have made every reasonable attempt to correct obvious errors, but I expect that there are errors of grammar and possibly content that I did not discover before finalizing the note.

## 2021-10-12 NOTE — ASSESSMENT & PLAN NOTE
-He should test regularly for HIV, especially when having new partners or engaging in high risk sexual activity

## 2021-10-12 NOTE — ASSESSMENT & PLAN NOTE
-He is agreeable to trying imiquimod  -Imiquimod 1 packet applied twice weekly for 16 weeks  -He will follow-up after that treatment is complete and will consider any further steps at that time

## 2021-11-08 ENCOUNTER — OFFICE VISIT (OUTPATIENT)
Dept: MEDICAL GROUP | Facility: CLINIC | Age: 21
End: 2021-11-08
Payer: MEDICARE

## 2021-11-08 VITALS
RESPIRATION RATE: 16 BRPM | SYSTOLIC BLOOD PRESSURE: 130 MMHG | HEIGHT: 72 IN | BODY MASS INDEX: 20.18 KG/M2 | OXYGEN SATURATION: 99 % | DIASTOLIC BLOOD PRESSURE: 68 MMHG | WEIGHT: 149 LBS | HEART RATE: 120 BPM

## 2021-11-08 DIAGNOSIS — A63.0 ANAL WARTS: ICD-10-CM

## 2021-11-08 PROCEDURE — 99213 OFFICE O/P EST LOW 20 MIN: CPT | Mod: GC | Performed by: STUDENT IN AN ORGANIZED HEALTH CARE EDUCATION/TRAINING PROGRAM

## 2021-11-09 NOTE — PROGRESS NOTES
Subjective:     CC: Medication concern    HPI:   Chris is a 21 y.o. male who presents today for the following problems:    Problem   Anal Warts    -Patient has been dealing with anal warts for several months  -These have been irritating to him as he feels it when he wipes, and has some itching.  -He had 2 treatments of cryotherapy which helped slightly, but only short-lived  -At her last visit a few weeks ago I prescribed topical imiquimod  -He has been applying this for the last 2 weeks twice weekly  -He reports that the area where he applies this is really red and irritated  -It is not overly bothersome to him, but he is concerned  -He does believe the imiquimod is helping resolve his warts         Current Outpatient Medications Ordered in Epic   Medication Sig Dispense Refill   • imiquimod (ALDARA) 5 % cream Apply 1 Packet topically two times a week. 32 Each 0     No current [facility]-ordered facility-administered medications on file.       ROS:  See HPI    Objective:     Exam:  /68 (BP Location: Right arm, Patient Position: Sitting, BP Cuff Size: Adult)   Pulse (!) 120   Resp 16   Ht 1.829 m (6')   Wt 67.6 kg (149 lb)   SpO2 99%   BMI 20.21 kg/m²  Body mass index is 20.21 kg/m².    Gen: Alert and oriented, No apparent distress.  Neck: Neck is supple without lymphadenopathy.  Lungs: Normal effort, CTA bilaterally, no wheezes, rhonchi, or rales  CV: Regular rate and rhythm. No murmurs, rubs, or gallops.  Ext: No clubbing, cyanosis, edema.      Assessment & Plan:     21 y.o. male with the following -     Problem List Items Addressed This Visit     Anal warts     -Since he feels like he is getting relief, and the side effects are not overly bothersome to him, he would like to continue this treatment  -He will try to continue this for a total of 16 weeks               Return in about 3 months (around 2/8/2022).    Please note that this dictation was created using voice recognition software. I have made every  reasonable attempt to correct obvious errors, but I expect that there are errors of grammar and possibly content that I did not discover before finalizing the note.

## 2021-11-19 ENCOUNTER — OFFICE VISIT (OUTPATIENT)
Dept: MEDICAL GROUP | Facility: CLINIC | Age: 21
End: 2021-11-19
Payer: MEDICARE

## 2021-11-19 VITALS
SYSTOLIC BLOOD PRESSURE: 122 MMHG | OXYGEN SATURATION: 97 % | WEIGHT: 149 LBS | RESPIRATION RATE: 16 BRPM | TEMPERATURE: 98.1 F | DIASTOLIC BLOOD PRESSURE: 82 MMHG | HEIGHT: 72 IN | BODY MASS INDEX: 20.18 KG/M2 | HEART RATE: 102 BPM

## 2021-11-19 DIAGNOSIS — Z00.00 WELL ADULT EXAM: ICD-10-CM

## 2021-11-19 DIAGNOSIS — B37.0 ORAL THRUSH: ICD-10-CM

## 2021-11-19 PROCEDURE — G0439 PPPS, SUBSEQ VISIT: HCPCS | Mod: GE | Performed by: STUDENT IN AN ORGANIZED HEALTH CARE EDUCATION/TRAINING PROGRAM

## 2021-11-19 PROCEDURE — 99213 OFFICE O/P EST LOW 20 MIN: CPT | Mod: GE,25 | Performed by: STUDENT IN AN ORGANIZED HEALTH CARE EDUCATION/TRAINING PROGRAM

## 2021-11-19 NOTE — ASSESSMENT & PLAN NOTE
On physical exam today this was discovered.  When asking him about if he has noticed it, he reports that he has noticed this time looking white, but did not think much of it.  This seems to be isolated to his tongue, and not involving his posterior oropharynx.  He does have sex with men, but tested negative for HIV, so my concern about this being related to HIV or AIDS is very well.  He also denies any inhaled corticosteroids.  -He is agreeable to starting nystatin swish and swallow suspension 500,000 units 4 times per day for 7 to 14 days.

## 2021-11-19 NOTE — PROGRESS NOTES
Subjective:     CC:   Chief Complaint   Patient presents with   • Annual Exam     Physical       HPI:   Chris Murillo is a 21 y.o. male who presents for an annual exam. He is feeling well. He reports that he did has some musculoskeletal aches and pains, which he attributes to exercising.    -We have been treating him for the past couple months for anal warts.  We did a couple rounds of cryotherapy, which did not provide much improvement.  We are now trying a 16-week course of imiquimod.  He is having some improvement, but reports there are new areas popping up.  He was not vaccinated for HPV, and got his first dose on October 12 of this year.    Health Maintenance  -He has had a good weight, exercises, and does not have any indication for screening at this time.    Cancer screening  -None indicated    Infectious disease screening/Immunizations  --STI Screening: H  --Practices safe sex.  --HIV Screening: e reports that he was screened for HIV 5 months ago, and was negative.  He denies having any sexual encounters since that time.  --Immunizations:    Influenza: He plans to get the flu vaccine here soon   HPV: Received first dose October 12, and plans to get the second dose this week.  He will get third dose in 6 months from the time of the first dose.    Tetanus: He is due for his tetanus shot, and agreeable to getting this   Shingles: Not indicated   Pneumococcal : Not indicated   COVID-19: Has received his first 2 doses of Pfizer, and plans to get his booster shot as well.    He  has a past medical history of Psychiatric disorder and Seizure disorder (HCC).  He  has no past surgical history on file.  History reviewed. No pertinent family history.  Social History     Tobacco Use   • Smoking status: Never Smoker   • Smokeless tobacco: Never Used   Vaping Use   • Vaping Use: Former   Substance Use Topics   • Alcohol use: Yes     Comment: rare   • Drug use: Yes     Types: Marijuana     Comment: marijuana        Patient Active Problem List    Diagnosis Date Noted   • Well adult exam 11/19/2021   • Oral thrush 11/19/2021   • Anal warts 10/12/2021   • At risk for HIV due to homosexual contact 10/12/2021       Current Outpatient Medications   Medication Sig Dispense Refill   • nystatin (MYCOSTATIN) 257580 UNIT/ML Suspension Take 5 mL by mouth 4 times a day. 300 mL 0   • imiquimod (ALDARA) 5 % cream Apply 1 Packet topically two times a week. 32 Each 0     No current facility-administered medications for this visit.    (including changes today)  Allergies: Latex    Review of Systems   See HPI    Objective:     /82 (BP Location: Right arm, Patient Position: Sitting, BP Cuff Size: Adult)   Pulse (!) 102   Temp 36.7 °C (98.1 °F) (Tympanic)   Resp 16   Ht 1.829 m (6')   Wt 67.6 kg (149 lb)   SpO2 97%   BMI 20.21 kg/m²   Body mass index is 20.21 kg/m².  Wt Readings from Last 4 Encounters:   11/19/21 67.6 kg (149 lb)   11/08/21 67.6 kg (149 lb)   10/12/21 63.5 kg (140 lb)   03/19/21 62.6 kg (138 lb 0.1 oz)       Physical Exam:  Constitutional: Well-developed and well-nourished. Not diaphoretic. No distress.   Skin: Skin is warm and dry. No rash noted.  Head: Atraumatic without lesions.  Eyes: Conjunctivae and extraocular motions are normal. Pupils are equal, round, and reactive to light. No scleral icterus.   Ears:  External ears unremarkable. Tympanic membranes clear and intact.  Nose: Nares patent. Septum midline. Turbinates without erythema nor edema. No discharge.   Mouth/Throat: Dentition is fair. Tongue diffusely covered with white plaque that cannot be scraped off with tongue depressor. Oropharynx is clear and moist. Posterior pharynx without erythema or exudates.  Neck: Supple, trachea midline. Normal range of motion. No thyromegaly present. No lymphadenopathy--cervical or supraclavicular.  Cardiovascular: Regular rate and rhythm, S1 and S2 without murmur, rubs, or gallops.    Lungs: Effort normal. Clear to  auscultation throughout. No adventitious sounds. No CVA tenderness.  Abdomen: Soft, non tender, and without distention. Active bowel sounds in all four quadrants. No rebound, guarding, masses or HSM.  Extremities: No cyanosis, clubbing, erythema, nor edema. Distal pulses intact and symmetric.   Musculoskeletal: All major joints AROM full in all directions without pain.  Neurological: Alert and oriented x 3. DTRs 2+/3 and symmetric. No cranial nerve deficit. 5/5 myotomes. Sensation intact.  Psychiatric:  Behavior, mood, and affect are appropriate.      Assessment and Plan:     Problem List Items Addressed This Visit     Well adult exam     This is a 21-year-old male who is doing well overall.  He is currently being treated for anal warts, and is tolerating the treatment well.  He does have sex with men, but reports testing negative for HIV 5 months ago.  He is active and exercises frequently.  He is at a healthy weight, up-to-date on most of his vaccinations, does not have any major concerns at this time.  -He will get his tetanus booster, COVID booster, flu shot, and second dose of HPV vaccine this week.         Oral thrush     On physical exam today this was discovered.  When asking him about if he has noticed it, he reports that he has noticed this time looking white, but did not think much of it.  This seems to be isolated to his tongue, and not involving his posterior oropharynx.  He does have sex with men, but tested negative for HIV, so my concern about this being related to HIV or AIDS is very well.  He also denies any inhaled corticosteroids.  -He is agreeable to starting nystatin swish and swallow suspension 500,000 units 4 times per day for 7 to 14 days.               Counseling about diet, supplements, exercise, skin care and safe sex.    Follow-up: No follow-ups on file.

## 2021-11-19 NOTE — ASSESSMENT & PLAN NOTE
This is a 21-year-old male who is doing well overall.  He is currently being treated for anal warts, and is tolerating the treatment well.  He does have sex with men, but reports testing negative for HIV 5 months ago.  He is active and exercises frequently.  He is at a healthy weight, up-to-date on most of his vaccinations, does not have any major concerns at this time.  -He will get his tetanus booster, COVID booster, flu shot, and second dose of HPV vaccine this week.

## 2021-11-30 ENCOUNTER — OFFICE VISIT (OUTPATIENT)
Dept: MEDICAL GROUP | Facility: CLINIC | Age: 21
End: 2021-11-30
Payer: MEDICARE

## 2021-11-30 VITALS
WEIGHT: 146 LBS | DIASTOLIC BLOOD PRESSURE: 76 MMHG | BODY MASS INDEX: 19.77 KG/M2 | OXYGEN SATURATION: 97 % | SYSTOLIC BLOOD PRESSURE: 110 MMHG | TEMPERATURE: 97.6 F | HEIGHT: 72 IN | HEART RATE: 74 BPM

## 2021-11-30 DIAGNOSIS — Z91.89 AT RISK FOR HIV DUE TO HOMOSEXUAL CONTACT: ICD-10-CM

## 2021-11-30 DIAGNOSIS — B37.0 ORAL THRUSH: ICD-10-CM

## 2021-11-30 PROCEDURE — 99213 OFFICE O/P EST LOW 20 MIN: CPT | Mod: GE | Performed by: STUDENT IN AN ORGANIZED HEALTH CARE EDUCATION/TRAINING PROGRAM

## 2021-11-30 NOTE — PROGRESS NOTES
Subjective:     CC: Follow-up on thrush    HPI:   Chris is a 21 y.o. male who presents today for the following problems:    Problem   Oral Thrush    -At her last visit a couple weeks ago he had oral thrush  -Prescribed nystatin swish and swallow and this has reportedly been helping  -He reports that he would likely not have enough to last until his symptoms are completely gone and would like a refill     At Risk for HIV Due to Homosexual Contact    -He reports that he was tested for HIV in the summer 2021  -He denies ever testing positive for HIV  -He does have sex with men and is at higher risk for HIV  -He reports that he has several friends to that he should get on PREP           Current Outpatient Medications Ordered in Epic   Medication Sig Dispense Refill   • nystatin (MYCOSTATIN) 800588 UNIT/ML Suspension Take 5 mL by mouth 4 times a day. 300 mL 0   • imiquimod (ALDARA) 5 % cream Apply 1 Packet topically two times a week. 32 Each 0     No current [facility]-ordered facility-administered medications on file.       ROS:  See HPI      Objective:     Exam:  /76   Pulse 74   Temp 36.4 °C (97.6 °F)   Ht 1.829 m (6')   Wt 66.2 kg (146 lb)   SpO2 97%   BMI 19.80 kg/m²  Body mass index is 19.8 kg/m².    Gen: Alert and oriented, No apparent distress.  HEENT:  Small amount of white plaque on tongue  Lungs: Normal effort, CTA bilaterally, no wheezes, rhonchi, or rales  CV: Regular rate and rhythm. No murmurs, rubs, or gallops.  Ext: No clubbing, cyanosis, edema.      Assessment & Plan:     21 y.o. male with the following -     Problem List Items Addressed This Visit     At risk for HIV due to homosexual contact     -We discussed him going to [place], and he is agreeable to this         Relevant Orders    Referral to Other    Oral thrush     -Refill of nystatin swish and swallow sent to pharmacy               No follow-ups on file.    Please note that this dictation was created using voice recognition  software. I have made every reasonable attempt to correct obvious errors, but I expect that there are errors of grammar and possibly content that I did not discover before finalizing the note.

## 2021-12-14 NOTE — TELEPHONE ENCOUNTER
Chris was scheduled 12/14/21 for refills of his medication. He had to be rescheduled due to Dr. Sow not being in the office today, could you please send in a refill for him? His was able to reschedule to 12/28/21 but will be out of his medications by then.

## 2021-12-16 RX ORDER — IMIQUIMOD 12.5 MG/.25G
1 CREAM TOPICAL
Qty: 32 EACH | Refills: 0 | Status: SHIPPED | OUTPATIENT
Start: 2021-12-16 | End: 2022-01-14 | Stop reason: SDUPTHER

## 2021-12-21 ENCOUNTER — TELEPHONE (OUTPATIENT)
Dept: MEDICAL GROUP | Facility: CLINIC | Age: 21
End: 2021-12-21

## 2021-12-21 DIAGNOSIS — Z11.3 ROUTINE SCREENING FOR STI (SEXUALLY TRANSMITTED INFECTION): ICD-10-CM

## 2021-12-21 NOTE — TELEPHONE ENCOUNTER
Caller Name: Chris Murillo  Call Back Number: [phone]    How would the patient prefer to be contacted with a response: Phone call OK to leave a detailed message    Patient called requesting labs to get screened for HIV before his appointment on 12/28/21. He said the blood test and the urine test. Could you please place this order? Thank you!

## 2021-12-28 ENCOUNTER — OFFICE VISIT (OUTPATIENT)
Dept: MEDICAL GROUP | Facility: CLINIC | Age: 21
End: 2021-12-28
Payer: MEDICARE

## 2021-12-28 VITALS
WEIGHT: 146 LBS | OXYGEN SATURATION: 96 % | DIASTOLIC BLOOD PRESSURE: 78 MMHG | SYSTOLIC BLOOD PRESSURE: 126 MMHG | HEART RATE: 96 BPM | HEIGHT: 72 IN | RESPIRATION RATE: 16 BRPM | BODY MASS INDEX: 19.77 KG/M2

## 2021-12-28 DIAGNOSIS — N48.9 PENILE LESION: ICD-10-CM

## 2021-12-28 DIAGNOSIS — B37.0 ORAL THRUSH: ICD-10-CM

## 2021-12-28 DIAGNOSIS — Z11.3 ROUTINE SCREENING FOR STI (SEXUALLY TRANSMITTED INFECTION): ICD-10-CM

## 2021-12-28 DIAGNOSIS — A63.0 ANAL WARTS: ICD-10-CM

## 2021-12-28 PROCEDURE — 99214 OFFICE O/P EST MOD 30 MIN: CPT | Mod: GC | Performed by: STUDENT IN AN ORGANIZED HEALTH CARE EDUCATION/TRAINING PROGRAM

## 2021-12-29 NOTE — ASSESSMENT & PLAN NOTE
-On physical exam today there is very minimal signs of thrush  -We will discontinue nystatin at this time  -If symptoms return he will let me know

## 2021-12-29 NOTE — ASSESSMENT & PLAN NOTE
-On exam it appears that this could possibly be a genital wart  -He has no other lesions on his penis  -He is going to go to [place] [facility] for HIV PrEP, and will also discuss this with him

## 2021-12-29 NOTE — ASSESSMENT & PLAN NOTE
-At this time we will continue with the topical imiquimod for another 6 weeks or so  -At that time if he is not satisfied with the results, we can revert back to cryotherapy if he desires

## 2021-12-29 NOTE — PROGRESS NOTES
Subjective:     CC: Follow-up    HPI:   Chris is a 21 y.o. male who presents today for the following problems:    Problem   Routine Screening for Sti (Sexually Transmitted Infection)    -Earlier in 2021 he tested negative for gonorrhea, chlamydia, syphilis, and HIV  -He was sexually active in the summer 2021 and desires to be tested again     Penile Lesion    -He reports that about a week ago he noticed a small lesion on his penis  -Over that week this has not changed at all in character  -This is not bothersome to him, although he is worried about what it may be since he has a history of anal warts as well     Oral Thrush    -About a month ago he had some oral thrush  -I Prescribed nystatin swish and swallow and this helped a lot  -He reports he has been trying to clean his tongue frequently, and the white material on his tongue does come off     Anal Warts    -Patient has been dealing with anal warts for several months  -These have been irritating to him as he feels it when he wipes, and has some itching.  -He had 2 treatments of cryotherapy which helped slightly, but only short-lived  -We started imiquimod about 9 weeks ago which he has been applying twice each week  -He has been applying this for the last 2 weeks twice weekly  -This has not provided great relief, but is keeping him somewhat controlled         Current Outpatient Medications Ordered in Epic   Medication Sig Dispense Refill   • imiquimod (ALDARA) 5 % cream Apply 1 Packet topically two times a week. 32 Each 0   • nystatin (MYCOSTATIN) 187020 UNIT/ML Suspension Take 5 mL by mouth 4 times a day. 300 mL 0     No current Epic-ordered facility-administered medications on file.       ROS:  See HPI      Objective:     Exam:  /78 (BP Location: Right arm, Patient Position: Sitting, BP Cuff Size: Adult)   Pulse 96   Resp 16   Ht 1.829 m (6')   Wt 66.2 kg (146 lb)   SpO2 96%   BMI 19.80 kg/m²  Body mass index is 19.8 kg/m².    Gen: Alert and  oriented, No apparent distress.  HEENT: Oropharynx is unremarkable aside from a scant amount of white material on tongue;  Lungs: Normal effort, CTA bilaterally, no wheezes, rhonchi, or rales  CV: Regular rate and rhythm. No murmurs, rubs, or gallops.  Ext: No clubbing, cyanosis, edema.  Genitourinary: Several small warts present at the anus; 1 small raised lesion on the shaft of the penis about 1 mm in diameter which is not erythematous and has no drainage      Assessment & Plan:     21 y.o. male with the following -     Problem List Items Addressed This Visit     Anal warts     -At this time we will continue with the topical imiquimod for another 6 weeks or so  -At that time if he is not satisfied with the results, we can revert back to cryotherapy if he desires         Oral thrush     -On physical exam today there is very minimal signs of thrush  -We will discontinue nystatin at this time  -If symptoms return he will let me know         Routine screening for STI (sexually transmitted infection)     -RPR, GC/chlamydia, hep C, HIV lab orders given         Relevant Orders    RPR (SYPHILIS)    CHLAMYDIA/GC PCR URINE OR SWAB    HEP C VIRUS ANTIBODY    Penile lesion     -On exam it appears that this could possibly be a genital wart  -He has no other lesions on his penis  -He is going to go to [facility] for HIV PrEP, and will also discuss this with him               No follow-ups on file.    Please note that this dictation was created using voice recognition software. I have made every reasonable attempt to correct obvious errors, but I expect that there are errors of grammar and possibly content that I did not discover before finalizing the note.

## 2021-12-30 ENCOUNTER — HOSPITAL ENCOUNTER (OUTPATIENT)
Dept: LAB | Facility: MEDICAL CENTER | Age: 21
End: 2021-12-30
Attending: STUDENT IN AN ORGANIZED HEALTH CARE EDUCATION/TRAINING PROGRAM
Payer: MEDICARE

## 2021-12-30 ENCOUNTER — TELEPHONE (OUTPATIENT)
Dept: MEDICAL GROUP | Facility: CLINIC | Age: 21
End: 2021-12-30

## 2021-12-30 DIAGNOSIS — Z11.3 ROUTINE SCREENING FOR STI (SEXUALLY TRANSMITTED INFECTION): ICD-10-CM

## 2021-12-31 DIAGNOSIS — Z72.52 HIGH RISK HOMOSEXUAL BEHAVIOR: ICD-10-CM

## 2021-12-31 NOTE — TELEPHONE ENCOUNTER
Krystal Bello from the lab called stating the Dx code on patient's lab will not be covered by Medicare. She did not know what the code needed to be for it to be covered. Could you please re-order with a different Dx code? The patient is upset, as the specimen was already collected and he was hoping for these results ASAP.   Thank you!

## 2022-01-01 NOTE — TELEPHONE ENCOUNTER
I re-ordered them under a different Dx code. I told him that it is very unlikely that he will be positive for any of these if what he told me is true.

## 2022-01-03 NOTE — TELEPHONE ENCOUNTER
Phone Number Called: [phone]    Call outcome: Spoke to patient regarding message below.    Message: Let patient know that the Dx on the order has been changed. I suggested he go online to make an appointment at the lab and to call back if there were any other issues with this.

## 2022-01-06 ENCOUNTER — HOSPITAL ENCOUNTER (OUTPATIENT)
Dept: LAB | Facility: MEDICAL CENTER | Age: 22
End: 2022-01-06
Attending: STUDENT IN AN ORGANIZED HEALTH CARE EDUCATION/TRAINING PROGRAM
Payer: MEDICARE

## 2022-01-06 DIAGNOSIS — Z72.52 HIGH RISK HOMOSEXUAL BEHAVIOR: ICD-10-CM

## 2022-01-06 LAB
HCV AB SER QL: NORMAL
HIV 1+2 AB+HIV1 P24 AG SERPL QL IA: NORMAL
RPR SER QL: NON REACTIVE
TREPONEMA PALLIDUM IGG+IGM AB [PRESENCE] IN SERUM OR PLASMA BY IMMUNOASSAY: REACTIVE

## 2022-01-06 PROCEDURE — 86592 SYPHILIS TEST NON-TREP QUAL: CPT

## 2022-01-06 PROCEDURE — 36415 COLL VENOUS BLD VENIPUNCTURE: CPT

## 2022-01-06 PROCEDURE — 87591 N.GONORRHOEAE DNA AMP PROB: CPT

## 2022-01-06 PROCEDURE — 86780 TREPONEMA PALLIDUM: CPT

## 2022-01-06 PROCEDURE — 87491 CHLMYD TRACH DNA AMP PROBE: CPT

## 2022-01-06 PROCEDURE — G0475 HIV COMBINATION ASSAY: HCPCS | Mod: GA

## 2022-01-06 PROCEDURE — 86803 HEPATITIS C AB TEST: CPT

## 2022-01-07 LAB
C TRACH DNA SPEC QL NAA+PROBE: NEGATIVE
N GONORRHOEA DNA SPEC QL NAA+PROBE: NEGATIVE
SPECIMEN SOURCE: NORMAL

## 2022-01-13 LAB — T PALLIDUM AB SER QL AGGL: REACTIVE

## 2022-01-14 ENCOUNTER — OFFICE VISIT (OUTPATIENT)
Dept: MEDICAL GROUP | Facility: CLINIC | Age: 22
End: 2022-01-14
Payer: MEDICARE

## 2022-01-14 VITALS
WEIGHT: 146 LBS | RESPIRATION RATE: 18 BRPM | HEART RATE: 108 BPM | BODY MASS INDEX: 19.77 KG/M2 | DIASTOLIC BLOOD PRESSURE: 89 MMHG | HEIGHT: 72 IN | SYSTOLIC BLOOD PRESSURE: 132 MMHG | OXYGEN SATURATION: 95 %

## 2022-01-14 DIAGNOSIS — A53.9 SYPHILIS IN MALE: ICD-10-CM

## 2022-01-14 DIAGNOSIS — Z20.6 CONTACT W AND (SUSPECTED) EXPOSURE TO HUMAN IMMUNODEF VIRUS: ICD-10-CM

## 2022-01-14 DIAGNOSIS — A63.0 ANAL WARTS: ICD-10-CM

## 2022-01-14 PROCEDURE — 99214 OFFICE O/P EST MOD 30 MIN: CPT | Mod: GC | Performed by: STUDENT IN AN ORGANIZED HEALTH CARE EDUCATION/TRAINING PROGRAM

## 2022-01-14 RX ORDER — EMTRICITABINE AND TENOFOVIR DISOPROXIL FUMARATE 200; 300 MG/1; MG/1
1 TABLET, FILM COATED ORAL DAILY
Qty: 30 TABLET | Refills: 2 | Status: SHIPPED | OUTPATIENT
Start: 2022-01-14 | End: 2022-04-07

## 2022-01-14 RX ORDER — IMIQUIMOD 12.5 MG/.25G
1 CREAM TOPICAL
Qty: 32 EACH | Refills: 0 | Status: SHIPPED | OUTPATIENT
Start: 2022-01-14 | End: 2022-05-16 | Stop reason: SDUPTHER

## 2022-01-15 NOTE — ASSESSMENT & PLAN NOTE
-We will prescribe Truvada 1 tab p.o. daily  -Hep B surface antigen testing, and BMP to evaluate baseline creatinine  -Follow-up in 1 month  -I encouraged him to keep trying to get [facility] with [facility] HIV clinic.  He is agreeable to this.

## 2022-01-15 NOTE — ASSESSMENT & PLAN NOTE
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

## 2022-01-15 NOTE — PROGRESS NOTES
Subjective:     CC: Follow-up on lab results    HPI:   Chris is a 21 y.o. male who presents today for the following problems:    Problem   Contact W and (Suspected) Exposure to Human Immunodef Virus    -Patient is a male who has sex with males and is at risk for HIV exposure  -Patient is interested in preexposure prophylaxis for HIV  -Recent HIV test was negative, as well as all other STDs except for syphilis     Syphilis in Male    -Upon screening for STDs a couple weeks ago, he had a nonreactive RPR, but a reactive treponemal test.  A confirmatory treponemal test was sent out and is reactive as well.  -He has no symptoms, and denies any ulcerations on his penis     Anal Warts    -Patient has been dealing with anal warts for several months  -These have been irritating to him as he feels it when he wipes, and has some itching.  -He had 2 treatments of cryotherapy which helped slightly, but only short-lived  -We started imiquimod a few months ago which he has been applying twice each week  -This has not provided great relief, but is keeping him somewhat controlled  -He would like to continue this         Current Outpatient Medications Ordered in Epic   Medication Sig Dispense Refill   • emtricitabine-tenofovir (TRUVADA) 200-300 MG per tablet Take 1 Tablet by mouth every day. 30 Tablet 2   • imiquimod (ALDARA) 5 % cream Apply 1 Packet topically two times a week. 32 Each 0   • nystatin (MYCOSTATIN) 280290 UNIT/ML Suspension Take 5 mL by mouth 4 times a day. 300 mL 0     No current Epic-ordered facility-administered medications on file.     ROS:  See HPI      Objective:     Exam:  /89 (BP Location: Right arm, Patient Position: Sitting, BP Cuff Size: Adult)   Pulse (!) 108   Resp 18   Ht 1.829 m (6')   Wt 66.2 kg (146 lb)   SpO2 95%   BMI 19.80 kg/m²  Body mass index is 19.8 kg/m².    Gen:     Alert and oriented, No apparent distress.  HEENT:   EOMI, PERRL  Neck:     Neck is supple without  lymphadenopathy.  Lungs:     Normal effort, CTA bilaterally, no wheezes, rhonchi, or rales  CV:     Regular rate and rhythm. No murmurs, rubs, or gallops.  Ext:     No clubbing, cyanosis, edema.    Labs:   Results for orders placed or performed during the hospital encounter of 01/06/22   HEP C VIRUS ANTIBODY   Result Value Ref Range    Hepatitis C Antibody Non-Reactive Non-Reactive   HIV AG/AB COMBO ASSAY SCREENING   Result Value Ref Range    HIV Ag/Ab Combo Assay Non-Reactive Non Reactive   Chlamydia/GC PCR Urine Or Swab   Result Value Ref Range    C. trachomatis by PCR Negative Negative    N. gonorrhoeae by PCR Negative Negative    Source Urine    T.PALLIDUM AB EIA   Result Value Ref Range    Syphilis, Treponemal Qual Reactive (A) Non-Reactive   RPR (SYPHILIS)   Result Value Ref Range    Rapid Plasma Reagin -Rpr- Non Reactive Non Reactive   ANTIBODY,TREPONEMA PALLIDUM   Result Value Ref Range    Mha-Tp Reactive (A) Non Reactive         Assessment & Plan:     21 y.o. male with the following -     Problem List Items Addressed This Visit     Anal warts     -Refill imiquimod 5% cream to use twice each week         Contact w and (suspected) exposure to human immunodef virus     -We will prescribe Truvada 1 tab p.o. daily  -Hep B surface antigen testing, and BMP to evaluate baseline creatinine  -Follow-up in 1 month  -I encouraged him to keep trying to [facility] clinic.  He is agreeable to this.         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

## 2022-02-04 ENCOUNTER — OFFICE VISIT (OUTPATIENT)
Dept: MEDICAL GROUP | Facility: CLINIC | Age: 22
End: 2022-02-04
Payer: MEDICARE

## 2022-02-04 VITALS
HEART RATE: 88 BPM | WEIGHT: 143 LBS | OXYGEN SATURATION: 97 % | RESPIRATION RATE: 16 BRPM | DIASTOLIC BLOOD PRESSURE: 86 MMHG | BODY MASS INDEX: 19.37 KG/M2 | HEIGHT: 72 IN | SYSTOLIC BLOOD PRESSURE: 130 MMHG

## 2022-02-04 DIAGNOSIS — G40.909 SEIZURE DISORDER (HCC): ICD-10-CM

## 2022-02-04 DIAGNOSIS — A53.9 SYPHILIS IN MALE: ICD-10-CM

## 2022-02-04 PROCEDURE — 99213 OFFICE O/P EST LOW 20 MIN: CPT | Mod: GE | Performed by: STUDENT IN AN ORGANIZED HEALTH CARE EDUCATION/TRAINING PROGRAM

## 2022-02-05 NOTE — PROGRESS NOTES
Subjective:     CC: DMV form    HPI:   Chris is a 22 y.o. male who presents today for the following problems:    Problem   Seizure Disorder (Hcc)    -He is a seizure disorder that was diagnosed when he was a child  -He had multiple seizures at a young age but has not had a seizure since 2009 when he was in fourth grade  -He was on antiseizure medication, but has not been on any since 2019  -He denies any neurological issues at this point  -He needs a DMV form filled out in order to get his license     Syphilis in Male    -Upon screening for STDs a couple weeks ago, he had a nonreactive RPR, but a reactive treponemal test.  A confirmatory treponemal test was sent out and is reactive as well.  -He has no symptoms, and denies any ulcerations on his penis  -He was diagnosed with latent syphilis and was referred to the health department  -He has since gone to [facility] and did receive 3 doses of penicillin G           Current Outpatient Medications Ordered in Epic   Medication Sig Dispense Refill   • emtricitabine-tenofovir (TRUVADA) 200-300 MG per tablet Take 1 Tablet by mouth every day. 30 Tablet 2   • imiquimod (ALDARA) 5 % cream Apply 1 Packet topically two times a week. 32 Each 0   • nystatin (MYCOSTATIN) 262133 UNIT/ML Suspension Take 5 mL by mouth 4 times a day. 300 mL 0     No current Epic-ordered facility-administered medications on file.     ROS:  See HPI      Objective:     Exam:  /86 (BP Location: Right arm, Patient Position: Sitting, BP Cuff Size: Adult)   Pulse 88   Resp 16   Ht 1.829 m (6')   Wt 64.9 kg (143 lb)   SpO2 97%   BMI 19.39 kg/m²  Body mass index is 19.39 kg/m².    Gen:     Alert and oriented, No apparent distress.  HEENT:   EOMI  Lungs:     Normal effort, CTA bilaterally, no wheezes, rhonchi, or rales  CV:     Regular rate and rhythm. No murmurs, rubs, or gallops.  Ext:     No clubbing, cyanosis, edema.      Assessment & Plan:     22 y.o. male with the  following -     Problem List Items Addressed This Visit     Syphilis in male     -He will go back to the health department in 6 months for repeat blood work         Seizure disorder (HCC)     -Since he has not had a seizure in more than 10 years, and has been off medication for more than 2 years, I have very low concern for him driving  -I filled out the DMV form for him             No follow-ups on file.

## 2022-02-05 NOTE — ASSESSMENT & PLAN NOTE
-Since he has not had a seizure in more than 10 years, and has been off medication for more than 2 years, I have very low concern for him driving  -I filled out the DMV form for him

## 2022-04-07 RX ORDER — EMTRICITABINE AND TENOFOVIR DISOPROXIL FUMARATE 200; 300 MG/1; MG/1
1 TABLET, FILM COATED ORAL DAILY
Qty: 30 TABLET | Refills: 2 | Status: SHIPPED | OUTPATIENT
Start: 2022-04-07 | End: 2022-05-16

## 2022-04-29 ENCOUNTER — APPOINTMENT (OUTPATIENT)
Dept: MEDICAL GROUP | Facility: CLINIC | Age: 22
End: 2022-04-29
Payer: MEDICARE

## 2022-05-16 ENCOUNTER — OFFICE VISIT (OUTPATIENT)
Dept: MEDICAL GROUP | Facility: CLINIC | Age: 22
End: 2022-05-16
Payer: MEDICARE

## 2022-05-16 VITALS
HEIGHT: 72 IN | WEIGHT: 149.1 LBS | SYSTOLIC BLOOD PRESSURE: 125 MMHG | HEART RATE: 81 BPM | DIASTOLIC BLOOD PRESSURE: 78 MMHG | BODY MASS INDEX: 20.19 KG/M2 | OXYGEN SATURATION: 97 %

## 2022-05-16 DIAGNOSIS — A63.0 ANAL WARTS: ICD-10-CM

## 2022-05-16 DIAGNOSIS — Z23 NEED FOR VACCINATION: ICD-10-CM

## 2022-05-16 DIAGNOSIS — Z91.89 AT RISK FOR HIV DUE TO HOMOSEXUAL CONTACT: ICD-10-CM

## 2022-05-16 PROCEDURE — 90714 TD VACC NO PRESV 7 YRS+ IM: CPT | Performed by: STUDENT IN AN ORGANIZED HEALTH CARE EDUCATION/TRAINING PROGRAM

## 2022-05-16 PROCEDURE — 90471 IMMUNIZATION ADMIN: CPT | Performed by: STUDENT IN AN ORGANIZED HEALTH CARE EDUCATION/TRAINING PROGRAM

## 2022-05-16 PROCEDURE — 90651 9VHPV VACCINE 2/3 DOSE IM: CPT | Performed by: STUDENT IN AN ORGANIZED HEALTH CARE EDUCATION/TRAINING PROGRAM

## 2022-05-16 PROCEDURE — 99213 OFFICE O/P EST LOW 20 MIN: CPT | Mod: 25,GE | Performed by: STUDENT IN AN ORGANIZED HEALTH CARE EDUCATION/TRAINING PROGRAM

## 2022-05-16 PROCEDURE — 90472 IMMUNIZATION ADMIN EACH ADD: CPT | Performed by: STUDENT IN AN ORGANIZED HEALTH CARE EDUCATION/TRAINING PROGRAM

## 2022-05-16 RX ORDER — IMIQUIMOD 12.5 MG/.25G
1 CREAM TOPICAL
Qty: 32 EACH | Refills: 0 | Status: SHIPPED | OUTPATIENT
Start: 2022-05-16 | End: 2022-09-23 | Stop reason: SDUPTHER

## 2022-05-16 RX ORDER — TRIAMCINOLONE ACETONIDE 1 MG/G
OINTMENT TOPICAL
COMMUNITY
Start: 2021-02-05 | End: 2022-05-16

## 2022-05-16 ASSESSMENT — PATIENT HEALTH QUESTIONNAIRE - PHQ9: CLINICAL INTERPRETATION OF PHQ2 SCORE: 0

## 2022-05-16 NOTE — ASSESSMENT & PLAN NOTE
-He has PrEP medication, and I encouraged him to use this well ahead of time if he thinks he is going to be sexually active  -He also knows he should wear condoms with each sexual encounter

## 2022-05-16 NOTE — PROGRESS NOTES
"Subjective:     CC: Medication refill    HPI:   Chris is a 22 y.o. male who presents today for the following problems:    Problem   Anal Warts    -Patient has been dealing with anal warts since summer 2021  -These have been irritating to him as he feels it when he wipes, and has some itching.  -He had 2 treatments of cryotherapy which helped slightly, but only short-lived  -We started imiquimod in late 2021 and this is been slowly improving things  -He has been on this treatment for several months, but still reports improvement, so he would like a refill.     At Risk for HIV Due to Homosexual Contact    -He has a history of having sex with other men, but has never tested positive for HIV  -January 2022 was the last time he was tested, this was also negative  -A few months ago he was interested in PrEP, so I prescribed Truvada.  However, he has not been taking this because he has not been sexually active.  -He is still trying to get in at the [facility] to talk to them more about this as well.           Current Outpatient Medications Ordered in Epic   Medication Sig Dispense Refill   • imiquimod (ALDARA) 5 % cream Apply 1 Packet topically two times a week. 32 Each 0     No current [facility]-ordered facility-administered medications on file.     ROS:  See HPI      Objective:     Exam:  /78 (BP Location: Left arm, Patient Position: Sitting, BP Cuff Size: Adult)   Pulse 81   Ht 1.822 m (5' 11.75\")   Wt 67.6 kg (149 lb 1.6 oz)   SpO2 97%   BMI 20.36 kg/m²  Body mass index is 20.36 kg/m².    Gen:     Alert and oriented, No apparent distress.  HEENT:   NCAT, EOMI  Neck:     Neck is supple without lymphadenopathy.  Lungs:     Normal effort, CTA bilaterally, no wheezes, rhonchi, or rales  CV:     Regular rate and rhythm. No murmurs, rubs, or gallops.  Ext:     No clubbing, cyanosis, edema.      Assessment & Plan:     22 y.o. male with the following -     Problem List Items Addressed This Visit     Anal warts     " -Refill imiquimod 5% cream to apply twice each week  -He is also going to get his third HPV dose today.  He did not get the HPV vaccines as a child or adolescent.           At risk for HIV due to homosexual contact     -He has PrEP medication, and I encouraged him to use this well ahead of time if he thinks he is going to be sexually active  -He also knows he should wear condoms with each sexual encounter             Other Visit Diagnoses     Need for vaccination        Relevant Orders    9VHPV Vaccine 2-3 Dose (GARDASIL 9)    TD Vaccine Preservative Free =>8YO IM

## 2022-05-16 NOTE — ASSESSMENT & PLAN NOTE
-Refill imiquimod 5% cream to apply twice each week  -He is also going to get his third HPV dose today.  He did not get the HPV vaccines as a child or adolescent.

## 2022-06-11 ENCOUNTER — HOSPITAL ENCOUNTER (OUTPATIENT)
Facility: MEDICAL CENTER | Age: 22
End: 2022-06-11
Attending: PHYSICIAN ASSISTANT
Payer: MEDICARE

## 2022-06-11 ENCOUNTER — OFFICE VISIT (OUTPATIENT)
Dept: URGENT CARE | Facility: CLINIC | Age: 22
End: 2022-06-11
Payer: MEDICARE

## 2022-06-11 VITALS
HEIGHT: 73 IN | HEART RATE: 88 BPM | RESPIRATION RATE: 16 BRPM | OXYGEN SATURATION: 99 % | WEIGHT: 150.8 LBS | BODY MASS INDEX: 19.99 KG/M2 | DIASTOLIC BLOOD PRESSURE: 76 MMHG | SYSTOLIC BLOOD PRESSURE: 120 MMHG | TEMPERATURE: 97.9 F

## 2022-06-11 DIAGNOSIS — J02.9 SORE THROAT: ICD-10-CM

## 2022-06-11 DIAGNOSIS — B34.9 VIRAL SYNDROME: ICD-10-CM

## 2022-06-11 DIAGNOSIS — R05.9 COUGH: ICD-10-CM

## 2022-06-11 DIAGNOSIS — J02.9 SORE THROAT: Primary | ICD-10-CM

## 2022-06-11 LAB
INT CON NEG: NORMAL
INT CON POS: NORMAL
S PYO AG THROAT QL: NEGATIVE

## 2022-06-11 PROCEDURE — U0003 INFECTIOUS AGENT DETECTION BY NUCLEIC ACID (DNA OR RNA); SEVERE ACUTE RESPIRATORY SYNDROME CORONAVIRUS 2 (SARS-COV-2) (CORONAVIRUS DISEASE [COVID-19]), AMPLIFIED PROBE TECHNIQUE, MAKING USE OF HIGH THROUGHPUT TECHNOLOGIES AS DESCRIBED BY CMS-2020-01-R: HCPCS

## 2022-06-11 PROCEDURE — 99213 OFFICE O/P EST LOW 20 MIN: CPT | Performed by: PHYSICIAN ASSISTANT

## 2022-06-11 PROCEDURE — U0005 INFEC AGEN DETEC AMPLI PROBE: HCPCS

## 2022-06-11 PROCEDURE — 87880 STREP A ASSAY W/OPTIC: CPT | Performed by: PHYSICIAN ASSISTANT

## 2022-06-11 NOTE — PROGRESS NOTES
"Subjective     Chris Murillo is a 22 y.o. male who presents with Pharyngitis (Dry throat, cough x 2 days )    Medications:    • imiquimod    Allergies: Latex    Problem List: Chris Murillo does not have any pertinent problems on file.    Surgical History:  No past surgical history on file.    Past Social Hx: Chris Murillo  reports that he has never smoked. He has never used smokeless tobacco. He reports current alcohol use. He reports current drug use. Drug: Marijuana.     Past Family Hx:  Chris Murillo family history is not on file.     Problem list, medications, and allergies reviewed by myself today in Epic.          Patient presents with:  Pharyngitis: Dry throat, cough x 2 days   Pt denies n/v/d, fever, chills or rash. No other complaints.          Pharyngitis   This is a new problem. The current episode started yesterday. The problem has been unchanged. Neither side of throat is experiencing more pain than the other. There has been no fever. Associated symptoms include coughing. Pertinent negatives include no abdominal pain, congestion, ear pain, headaches, plugged ear sensation, shortness of breath, stridor, swollen glands or trouble swallowing. He has tried cool liquids, gargles and acetaminophen for the symptoms. The treatment provided moderate relief.       Review of Systems   Constitutional: Negative for chills and fever.   HENT: Positive for sore throat. Negative for congestion, ear pain and trouble swallowing.    Respiratory: Positive for cough. Negative for sputum production, shortness of breath, wheezing and stridor.    Gastrointestinal: Negative for abdominal pain.   Genitourinary: Negative.    Neurological: Negative for headaches.   All other systems reviewed and are negative.             Objective     /76   Pulse 88   Temp 36.6 °C (97.9 °F) (Temporal)   Resp 16   Ht 1.854 m (6' 1\")   Wt 68.4 kg (150 lb 12.8 oz)   SpO2 99%   BMI 19.90 kg/m²      Physical Exam  Vitals and " nursing note reviewed. Exam conducted with a chaperone present.   Constitutional:       General: He is not in acute distress.     Appearance: Normal appearance. He is well-developed and normal weight. He is not ill-appearing or toxic-appearing.   HENT:      Head: Normocephalic and atraumatic.      Right Ear: Tympanic membrane normal.      Left Ear: Tympanic membrane normal.      Nose: Nose normal.      Mouth/Throat:      Lips: Pink.      Mouth: Mucous membranes are moist.      Pharynx: Oropharynx is clear. Uvula midline. Posterior oropharyngeal erythema present.      Tonsils: No tonsillar exudate.   Eyes:      Extraocular Movements: Extraocular movements intact.      Conjunctiva/sclera: Conjunctivae normal.      Pupils: Pupils are equal, round, and reactive to light.   Cardiovascular:      Rate and Rhythm: Normal rate and regular rhythm.      Pulses: Normal pulses.      Heart sounds: Normal heart sounds.   Pulmonary:      Effort: Pulmonary effort is normal.      Breath sounds: Normal breath sounds.   Abdominal:      General: Bowel sounds are normal.      Palpations: Abdomen is soft.   Musculoskeletal:         General: Normal range of motion.      Cervical back: Normal range of motion and neck supple.   Skin:     General: Skin is warm and dry.      Capillary Refill: Capillary refill takes less than 2 seconds.   Neurological:      General: No focal deficit present.      Mental Status: He is alert and oriented to person, place, and time.      Cranial Nerves: No cranial nerve deficit.      Motor: Motor function is intact.      Coordination: Coordination is intact.      Gait: Gait normal.   Psychiatric:         Mood and Affect: Mood normal.                           Lab Results/POC Test Results   Results for orders placed or performed in visit on 06/11/22   POCT Rapid Strep A   Result Value Ref Range    Rapid Strep Screen negative     Internal Control Positive Valid     Internal Control Negative Valid               Assessment & Plan                   1. Sore throat  POCT Rapid Strep A    COVID/SARS CoV-2 PCR   2. Cough  POCT Rapid Strep A    COVID/SARS CoV-2 PCR   3. Viral syndrome  POCT Rapid Strep A    COVID/SARS CoV-2 PCR     Per protocol for PUI/ISO patients, the patient was evaluated by me while I was wearing PPE.  Per CDC guidelines, patient has been instructed to self quarantine at home until test results are known.  IF positive, pt to remain at home for 5 days from onset of symptoms.  If negative, pt to remain at home until fever has resolved.       PT can begin or continue OTC medications, increase fluids and rest until symptoms improve.     PT advised saltwater gargles/swishes  3-4 times daily until symptoms improve.     PT should follow up with PCP in 1-2 days for re-evaluation if symptoms have not improved.      Discussed red flags and reasons to return to UC or ED.      Pt and/or family verbalized understanding of diagnosis and follow up instructions and was offered informational handout on diagnosis.  PT discharged.

## 2022-06-12 LAB
COVID ORDER STATUS COVID19: NORMAL
SARS-COV-2 RNA RESP QL NAA+PROBE: NOTDETECTED
SPECIMEN SOURCE: NORMAL

## 2022-06-16 PROBLEM — Z79.899 OTHER LONG TERM (CURRENT) DRUG THERAPY: Status: ACTIVE | Noted: 2020-12-21

## 2022-06-16 PROBLEM — Z11.3 ENCOUNTER FOR SCREENING FOR INFECTIONS WITH A PREDOMINANTLY SEXUAL MODE OF TRANSMISSION: Status: ACTIVE | Noted: 2019-07-01

## 2022-06-16 PROBLEM — E03.9 HYPOTHYROIDISM: Status: ACTIVE | Noted: 2018-03-07

## 2022-06-16 PROBLEM — Z72.51 HIGH RISK SEXUAL BEHAVIOR: Status: ACTIVE | Noted: 2021-04-27

## 2022-06-16 PROBLEM — L25.9 CONTACT DERMATITIS: Status: ACTIVE | Noted: 2021-01-26

## 2022-06-16 PROBLEM — K30 INDIGESTION: Status: ACTIVE | Noted: 2019-07-01

## 2022-06-16 PROBLEM — J35.8 AMYGDALOLITH: Status: ACTIVE | Noted: 2021-01-05

## 2022-06-16 PROBLEM — A63.0 CONDYLOMA ACUMINATUM: Status: ACTIVE | Noted: 2021-06-18

## 2022-06-16 PROBLEM — G40.909 SEIZURE DISORDER (HCC): Status: ACTIVE | Noted: 2021-01-26

## 2022-06-16 PROBLEM — R03.0 FINDING OF ABOVE NORMAL BLOOD PRESSURE: Status: ACTIVE | Noted: 2021-04-27

## 2022-06-16 PROBLEM — R59.9 REACTIVE LYMPHADENOPATHY: Status: ACTIVE | Noted: 2019-10-22

## 2022-06-16 ASSESSMENT — ENCOUNTER SYMPTOMS
CHILLS: 0
TROUBLE SWALLOWING: 0
SPUTUM PRODUCTION: 0
FEVER: 0
HEADACHES: 0
COUGH: 1
WHEEZING: 0
ABDOMINAL PAIN: 0
STRIDOR: 0
SORE THROAT: 1
SWOLLEN GLANDS: 0
SHORTNESS OF BREATH: 0

## 2022-06-30 ENCOUNTER — APPOINTMENT (OUTPATIENT)
Dept: MEDICAL GROUP | Facility: CLINIC | Age: 22
End: 2022-06-30
Payer: MEDICARE

## 2022-07-07 ENCOUNTER — APPOINTMENT (OUTPATIENT)
Dept: MEDICAL GROUP | Facility: CLINIC | Age: 22
End: 2022-07-07
Payer: MEDICARE

## 2022-07-11 ENCOUNTER — APPOINTMENT (OUTPATIENT)
Dept: MEDICAL GROUP | Facility: CLINIC | Age: 22
End: 2022-07-11
Payer: MEDICARE

## 2022-09-23 RX ORDER — IMIQUIMOD 12.5 MG/.25G
1 CREAM TOPICAL
Qty: 32 EACH | Refills: 3 | Status: SHIPPED | OUTPATIENT
Start: 2022-09-23 | End: 2023-03-07 | Stop reason: SDUPTHER

## 2022-09-23 NOTE — TELEPHONE ENCOUNTER
Patient requesting refill of imiquimod (ALDARA) 5 % cream. Was a pt of Dr. Sow, has F/U scheduled. Out of medication

## 2022-10-04 ENCOUNTER — APPOINTMENT (OUTPATIENT)
Dept: RADIOLOGY | Facility: MEDICAL CENTER | Age: 22
End: 2022-10-04
Attending: EMERGENCY MEDICINE
Payer: MEDICARE

## 2022-10-04 ENCOUNTER — HOSPITAL ENCOUNTER (EMERGENCY)
Facility: MEDICAL CENTER | Age: 22
End: 2022-10-04
Attending: EMERGENCY MEDICINE
Payer: MEDICARE

## 2022-10-04 VITALS
OXYGEN SATURATION: 95 % | HEART RATE: 81 BPM | TEMPERATURE: 97.9 F | SYSTOLIC BLOOD PRESSURE: 143 MMHG | RESPIRATION RATE: 16 BRPM | DIASTOLIC BLOOD PRESSURE: 71 MMHG | WEIGHT: 145 LBS | BODY MASS INDEX: 19.64 KG/M2 | HEIGHT: 72 IN

## 2022-10-04 DIAGNOSIS — N39.0 URINARY TRACT INFECTION WITHOUT HEMATURIA, SITE UNSPECIFIED: ICD-10-CM

## 2022-10-04 DIAGNOSIS — R55 VASOVAGAL SYNCOPE: ICD-10-CM

## 2022-10-04 LAB
APPEARANCE UR: CLEAR
BACTERIA #/AREA URNS HPF: ABNORMAL /HPF
BILIRUB UR QL STRIP.AUTO: NEGATIVE
C TRACH DNA SPEC QL NAA+PROBE: NEGATIVE
COLOR UR: ABNORMAL
EKG IMPRESSION: NORMAL
EPI CELLS #/AREA URNS HPF: ABNORMAL /HPF
GLUCOSE UR STRIP.AUTO-MCNC: NEGATIVE MG/DL
HYALINE CASTS #/AREA URNS LPF: ABNORMAL /LPF
KETONES UR STRIP.AUTO-MCNC: ABNORMAL MG/DL
LEUKOCYTE ESTERASE UR QL STRIP.AUTO: ABNORMAL
MICRO URNS: ABNORMAL
N GONORRHOEA DNA SPEC QL NAA+PROBE: NEGATIVE
NITRITE UR QL STRIP.AUTO: NEGATIVE
PH UR STRIP.AUTO: 5.5 [PH] (ref 5–8)
PROT UR QL STRIP: NEGATIVE MG/DL
RBC # URNS HPF: ABNORMAL /HPF
RBC UR QL AUTO: NEGATIVE
SP GR UR STRIP.AUTO: 1.03
SPECIMEN SOURCE: NORMAL
UROBILINOGEN UR STRIP.AUTO-MCNC: 0.2 MG/DL
WBC #/AREA URNS HPF: ABNORMAL /HPF

## 2022-10-04 PROCEDURE — 93005 ELECTROCARDIOGRAM TRACING: CPT | Performed by: EMERGENCY MEDICINE

## 2022-10-04 PROCEDURE — 700111 HCHG RX REV CODE 636 W/ 250 OVERRIDE (IP): Performed by: EMERGENCY MEDICINE

## 2022-10-04 PROCEDURE — 700102 HCHG RX REV CODE 250 W/ 637 OVERRIDE(OP): Performed by: EMERGENCY MEDICINE

## 2022-10-04 PROCEDURE — A9270 NON-COVERED ITEM OR SERVICE: HCPCS | Performed by: EMERGENCY MEDICINE

## 2022-10-04 PROCEDURE — 87086 URINE CULTURE/COLONY COUNT: CPT

## 2022-10-04 PROCEDURE — 96374 THER/PROPH/DIAG INJ IV PUSH: CPT

## 2022-10-04 PROCEDURE — 81001 URINALYSIS AUTO W/SCOPE: CPT

## 2022-10-04 PROCEDURE — 87491 CHLMYD TRACH DNA AMP PROBE: CPT

## 2022-10-04 PROCEDURE — 96372 THER/PROPH/DIAG INJ SC/IM: CPT | Mod: XU

## 2022-10-04 PROCEDURE — 99285 EMERGENCY DEPT VISIT HI MDM: CPT

## 2022-10-04 PROCEDURE — 87591 N.GONORRHOEAE DNA AMP PROB: CPT

## 2022-10-04 PROCEDURE — 71045 X-RAY EXAM CHEST 1 VIEW: CPT

## 2022-10-04 PROCEDURE — 93005 ELECTROCARDIOGRAM TRACING: CPT

## 2022-10-04 PROCEDURE — 700101 HCHG RX REV CODE 250: Performed by: EMERGENCY MEDICINE

## 2022-10-04 RX ORDER — CEFTRIAXONE 500 MG/1
500 INJECTION, POWDER, FOR SOLUTION INTRAMUSCULAR; INTRAVENOUS ONCE
Status: COMPLETED | OUTPATIENT
Start: 2022-10-04 | End: 2022-10-04

## 2022-10-04 RX ORDER — AZITHROMYCIN 250 MG/1
1000 TABLET, FILM COATED ORAL ONCE
Status: COMPLETED | OUTPATIENT
Start: 2022-10-04 | End: 2022-10-04

## 2022-10-04 RX ORDER — SULFAMETHOXAZOLE AND TRIMETHOPRIM 800; 160 MG/1; MG/1
1 TABLET ORAL 2 TIMES DAILY
Qty: 14 TABLET | Refills: 0 | Status: SHIPPED | OUTPATIENT
Start: 2022-10-04 | End: 2022-10-11

## 2022-10-04 RX ORDER — ONDANSETRON 2 MG/ML
4 INJECTION INTRAMUSCULAR; INTRAVENOUS ONCE
Status: COMPLETED | OUTPATIENT
Start: 2022-10-04 | End: 2022-10-04

## 2022-10-04 RX ADMIN — AZITHROMYCIN MONOHYDRATE 1000 MG: 250 TABLET ORAL at 09:57

## 2022-10-04 RX ADMIN — ONDANSETRON 4 MG: 2 INJECTION INTRAMUSCULAR; INTRAVENOUS at 09:58

## 2022-10-04 RX ADMIN — CEFTRIAXONE SODIUM 500 MG: 500 INJECTION, POWDER, FOR SOLUTION INTRAMUSCULAR; INTRAVENOUS at 10:00

## 2022-10-04 RX ADMIN — LIDOCAINE HYDROCHLORIDE 1 ML: 10 INJECTION, SOLUTION EPIDURAL; INFILTRATION; INTRACAUDAL; PERINEURAL at 10:00

## 2022-10-04 NOTE — ED PROVIDER NOTES
ED Provider Note    Scribed for Carloz Schofield M.D. by Ramona Kramer. 10/4/2022, 7:54 AM.    Primary care provider: Girma Sow M.D.  Means of arrival: walk in  History obtained from: patient  History limited by: none    CHIEF COMPLAINT  Chief Complaint   Patient presents with    Syncope     Pt states he got dizzy while walking to bathroom and had a syncopal episode. Denies trauma.     Painful Urination     Symptoms started yesterday. Pt reports discharge from penis this am.        HPI  Chris Murillo is a 22 y.o. male who presents to the Emergency Department for a syncopal episode onset this morning. Patient states he woke up this morning to go to the bathroom and upon walking to the bathroom he began to feel sweaty and light headed. Patient states he was standing while urinating when he blacked out and fell to the ground for a minute or two. He denies any pain or discomfort with urination, abdominal pain or groin pain. He does report having discharge from his penis and intermittent pinching sensation on the tip of his penis onset yesterday. Patient states he is sexually active with his male partner. Patient has a past medical history of a seizure disorder but was taken off his Depakote medication 3 years ago as his last seizure was in 4th grade. He also reports having a history of syncopal episodes while showering in warm water from [age] years old.    REVIEW OF SYSTEMS  As above otherwise all other systems are negative    PAST MEDICAL HISTORY   has a past medical history of Psychiatric disorder and Seizure disorder (HCC).    SURGICAL HISTORY  patient denies any surgical history    SOCIAL HISTORY  Social History     Tobacco Use    Smoking status: Never    Smokeless tobacco: Never   Vaping Use    Vaping Use: Former   Substance Use Topics    Alcohol use: Yes     Comment: rare    Drug use: Yes     Types: Marijuana     Comment: marijuana      Social History     Substance and Sexual Activity   Drug Use Yes     Types: Marijuana    Comment: marijuana       FAMILY HISTORY  History reviewed. No pertinent family history.    CURRENT MEDICATIONS  Home Medications       Reviewed by Lea Owen R.N. (Registered Nurse) on 10/04/22 at 0711  Med List Status: Not Addressed     Medication Last Dose Status   imiquimod (ALDARA) 5 % cream  Active                    ALLERGIES  Allergies   Allergen Reactions    Latex        PHYSICAL EXAM  VITAL SIGNS: BP (!) 141/82   Pulse (!) 102   Temp 36.2 °C (97.2 °F) (Temporal)   Resp 18   Ht 1.829 m (6')   Wt 65.8 kg (145 lb)   SpO2 100%   BMI 19.67 kg/m²     Constitutional: Well developed, Well nourished, No acute distress, Non-toxic appearance.   HENT: Normocephalic, Atraumatic, Bilateral external ears normal, oropharynx moist, No oral exudates, Nose normal.   Eyes:conjunctiva is normal, there are no signs of exudate.   Neck: Supple, no meningeal signs.  Lymphatic: No lymphadenopathy noted.   Cardiovascular: Regular rate and rhythm without murmurs gallops or rubs.   Thorax & Lungs: No respiratory distress. Breathing comfortably. Lungs are clear to auscultation bilaterally, there are no wheezes no rales. Chest wall is nontender.  Abdomen: Soft, nontender, nondistended. Bowel sounds are present.   Skin: Warm, Dry, No erythema,   Back: No tenderness, No CVA tenderness.  Musculoskeletal: Good range of motion in all major joints. No tenderness to palpation or major deformities noted. Intact distal pulses, no clubbing, no cyanosis, no edema,   Neurologic: Alert & oriented x 3, Moving all extremities. No gross abnormalities.    Psychiatric: Affect normal, Judgment normal, Mood normal.     LABS  Results for orders placed or performed during the hospital encounter of 10/04/22   URINALYSIS CULTURE, IF INDICATED    Specimen: Urine, Clean Catch   Result Value Ref Range    Color DK Yellow     Character Clear     Specific Gravity 1.026 <1.035    Ph 5.5 5.0 - 8.0    Glucose Negative Negative mg/dL     Ketones Trace (A) Negative mg/dL    Protein Negative Negative mg/dL    Bilirubin Negative Negative    Urobilinogen, Urine 0.2 Negative    Nitrite Negative Negative    Leukocyte Esterase Small (A) Negative    Occult Blood Negative Negative    Micro Urine Req Microscopic    Chlamydia/GC, PCR (Urine)    Specimen: Urine   Result Value Ref Range    Source Urine    URINE MICROSCOPIC (W/UA)   Result Value Ref Range    WBC 10-20 (A) /hpf    RBC 0-2 (A) /hpf    Bacteria Moderate (A) None /hpf    Epithelial Cells Rare /hpf    Hyaline Cast 0-2 /lpf   URINE CULTURE(NEW)    Specimen: Urine   Result Value Ref Range    Significant Indicator NEG     Source UR     Site -     Culture Result -    EKG (NOW)   Result Value Ref Range    Report       [facility]    Test Date:  2022-10-04  Pt Name:    JESSIKA FOWLER                 Department: ER  MRN:        [id number]                      Room:  Gender:     Male                         Technician: [id number]  :        2000                   Requested By:ER TRIAGE PROTOCOL  Order #:    [id number]                    Reading MD: BHARTI DAVALOS MD    Measurements  Intervals                                Axis  Rate:       89                           P:          63  KS:         125                          QRS:        24  QRSD:       99                           T:          20  QT:         349  QTc:        425    Interpretive Statements  Sinus rhythm  RSR' in V1 or V2, right VCD or RVH  Baseline wander in lead(s) V2  No previous ECG available for comparison  Otherwise Normal ECG  Electronically Signed On 10-4-2022 8:24:35 PDT by BHARTI DAVALOS MD       All labs reviewed by me.    EKG  Interpreted by me  Results for orders placed or performed during the hospital encounter of 10/04/22   EKG (NOW)   Result Value Ref Range    Report       [facility]    Test Date:  2022-10-04  Pt Name:    JESSIKA FOWLER                  Department: ER  MRN:        [id number]                      Room:  Gender:     Male                         Technician: [id number]  :        2000                   Requested By:ER TRIAGE PROTOCOL  Order #:    [id number]                    Reading MD: BHARTI DAVALOS MD    Measurements  Intervals                                Axis  Rate:       89                           P:          63  CA:         125                          QRS:        24  QRSD:       99                           T:          20  QT:         349  QTc:        425    Interpretive Statements  Sinus rhythm  RSR' in V1 or V2, right VCD or RVH  Baseline wander in lead(s) V2  No previous ECG available for comparison  Otherwise Normal ECG  Electronically Signed On 10-4-2022 8:24:35 PDT by BHARTI DAVALOS MD           RADIOLOGY  DX-CHEST-PORTABLE (1 VIEW)   Final Result      No acute cardiopulmonary abnormality.        The radiologist's interpretation of all radiological studies have been reviewed by me.    COURSE & MEDICAL DECISION MAKING  Pertinent Labs & Imaging studies reviewed. (See chart for details)    7:54 AM - Patient seen and examined at bedside. Ordered EKG, DX-chest, urine microscopic w/ UA, urinalysis culture, and Chlamydia/GC by PCR to evaluate his symptoms. The differential diagnoses include but are not limited to: Vasovagal syncope.    9:39 AM - I reevaluated the patient at bedside. I discussed the patient's diagnostic study results which show an acute UTI and I will prescribe the patient antibiotics for treatment. Patient will be treated with Zofran 4 mg, Rocephin 500 mg, Zithromax 1000 mg, and Xylocaine 1 mL for his symptoms prior to discharge. I discussed plan for discharge and follow up as outlined below. The patient is stable for discharge at this time and will return for any new or worsening symptoms. Patient verbalizes understanding and support with my plan for discharge.       Decision Making:  Patient presents for evaluation.   2 problems first problem syncope the second problem is the urinary discharge.  For the first problem I do feel is most likely a vasovagal syncopal syndrome such as micturition syncope.  Patient has had a history of syncopal events when he was younger.  I do feel he is at risk.  At this point EKG was normal chest x-ray was normal.  His urine does appear to have white blood cells and bacteria it could be a simple cystitis but also could be an STD so I will treat empirically for both with Rocephin IM dose 1 g of Zithromax to cover for STDs and a course of Bactrim to cover for a urinary tract infection.  Have sent for urine culture as well as chlamydia and gonorrhea specimens.  Patient has been told to follow-up with her primary care physician 1 week for follow-up on his urinary symptoms.  The patient is to return as needed.     The patient will return for new or worsening symptoms and is stable at the time of discharge.    DISPOSITION:  Patient will be discharged home in stable condition.    FOLLOW UP:  Girma Sow M.D.  [address] [facility] [id number] [phone]    Schedule an appointment as soon as possible for a visit   For re-check, Return if any symptoms worsen      OUTPATIENT MEDICATIONS:  New Prescriptions    No medications on file          FINAL IMPRESSION  1. Vasovagal syncope    2. Urinary tract infection without hematuria, site unspecified          Ramona MANDEL (Janis), am scribing for, and in the presence of, Carloz Schofield M.D..    Electronically signed by: Ramona Isaacs), 10/4/2022    Carloz MANDEL M.D. personally performed the services described in this documentation, as scribed by Ramona Kramer in my presence, and it is both accurate and complete.    The note accurately reflects work and decisions made by me.  Carloz Schofield M.D.  10/4/2022  11:24 AM

## 2022-10-04 NOTE — ED NOTES
Chief Complaint   Patient presents with   • Syncope     Pt states he got dizzy while walking to bathroom and had a syncopal episode. Denies trauma.    • Painful Urination     Symptoms started yesterday. Pt reports discharge from penis this am.      BP (!) 141/82   Pulse (!) 102   Temp 36.2 °C (97.2 °F) (Temporal)   Resp 18   Ht 1.829 m (6')   Wt 65.8 kg (145 lb)   SpO2 100%   BMI 19.67 kg/m²     Pt brought into triage by WC, mask in place. NAD, encouraged to return to the triage nurse or tech with any new complaints or symptoms. Urine cup provided.

## 2022-10-04 NOTE — ED NOTES
Reviewed discharge instructions with patient and answered any questions. Patient discharged home and encouraged to follow up with PCP. Patient informed of meds to  at pharmacy. Patient ambulating with steady gait at discharge.

## 2022-10-06 LAB
BACTERIA UR CULT: NORMAL
SIGNIFICANT IND 70042: NORMAL
SITE SITE: NORMAL
SOURCE SOURCE: NORMAL

## 2022-10-12 ENCOUNTER — OFFICE VISIT (OUTPATIENT)
Dept: URGENT CARE | Facility: CLINIC | Age: 22
End: 2022-10-12
Payer: MEDICARE

## 2022-10-12 VITALS
RESPIRATION RATE: 20 BRPM | WEIGHT: 155 LBS | HEART RATE: 100 BPM | TEMPERATURE: 98.6 F | SYSTOLIC BLOOD PRESSURE: 112 MMHG | DIASTOLIC BLOOD PRESSURE: 60 MMHG | BODY MASS INDEX: 20.99 KG/M2 | OXYGEN SATURATION: 98 % | HEIGHT: 72 IN

## 2022-10-12 DIAGNOSIS — R30.0 DYSURIA: ICD-10-CM

## 2022-10-12 DIAGNOSIS — Z11.3 ROUTINE SCREENING FOR STI (SEXUALLY TRANSMITTED INFECTION): ICD-10-CM

## 2022-10-12 PROCEDURE — 99213 OFFICE O/P EST LOW 20 MIN: CPT | Performed by: NURSE PRACTITIONER

## 2022-10-12 RX ORDER — SULFAMETHOXAZOLE AND TRIMETHOPRIM 800; 160 MG/1; MG/1
1 TABLET ORAL 2 TIMES DAILY
Qty: 14 TABLET | Refills: 0 | Status: SHIPPED | OUTPATIENT
Start: 2022-10-12 | End: 2022-10-19

## 2022-10-12 ASSESSMENT — ENCOUNTER SYMPTOMS
NAUSEA: 0
FEVER: 0
VOMITING: 0
CHILLS: 0
FLANK PAIN: 0

## 2022-10-13 NOTE — PROGRESS NOTES
Subjective:     Chris Murillo is a 22 y.o. male who presents for Penis Pain (X 1 week ago, seen at ER, treated, took last abx today)      HPI  Pt presents for evaluation of a new problem.  Chris is a very pleasant 22-year-old male who presents to urgent care today with continued dysuria after being treated for a urinary tract infection 8 days ago.  He was seen in the emergency room after developing painful urination, groin pain and penile discharge.  He was placed on Bactrim for UTI after being tested and treated for gonorrhea/chlamydia which both came back negative.  His urine culture was negative however, he does note greatly reduced discomfort following treatment.  He was given 7 days of antibiotics.  He notes that his pain is a 5/10 and only occurs in the morning with voiding and with masturbation.  He states that prior to his urinary tract infection symptoms he did have unprotected sex with his male partner.  He denies current fever, chills, nausea, vomiting, abdominal pain or flank pain.  He would like to be tested for HIV and syphilis today.    Review of Systems   Constitutional:  Negative for chills and fever.   Gastrointestinal:  Negative for nausea and vomiting.   Genitourinary:  Positive for dysuria. Negative for flank pain, frequency, hematuria and urgency.     PMH:   Past Medical History:   Diagnosis Date    Psychiatric disorder     ADHD    Seizure disorder (HCC)      ALLERGIES:   Allergies   Allergen Reactions    Latex      SURGHX: History reviewed. No pertinent surgical history.  SOCHX:   Social History     Socioeconomic History    Marital status: Single   Tobacco Use    Smoking status: Never    Smokeless tobacco: Never   Vaping Use    Vaping Use: Former   Substance and Sexual Activity    Alcohol use: Yes     Comment: rare    Drug use: Yes     Types: Marijuana     Comment: marijuana    Sexual activity: Not Currently     Partners: Male     FH: History reviewed. No pertinent family history.       Objective:   /60   Pulse 100   Temp 37 °C (98.6 °F) (Temporal)   Resp 20   Ht 1.829 m (6')   Wt 70.3 kg (155 lb)   SpO2 98%   BMI 21.02 kg/m²     Physical Exam  Vitals and nursing note reviewed.   Constitutional:       General: He is not in acute distress.     Appearance: Normal appearance. He is not ill-appearing.   HENT:      Head: Normocephalic and atraumatic.      Right Ear: External ear normal.      Left Ear: External ear normal.      Nose: No congestion or rhinorrhea.      Mouth/Throat:      Mouth: Mucous membranes are moist.   Eyes:      Extraocular Movements: Extraocular movements intact.      Pupils: Pupils are equal, round, and reactive to light.   Cardiovascular:      Rate and Rhythm: Normal rate and regular rhythm.      Pulses: Normal pulses.      Heart sounds: Normal heart sounds.   Pulmonary:      Effort: Pulmonary effort is normal.      Breath sounds: Normal breath sounds.   Abdominal:      General: Abdomen is flat. Bowel sounds are normal.      Palpations: Abdomen is soft.      Tenderness: There is no abdominal tenderness. There is no right CVA tenderness or left CVA tenderness.   Musculoskeletal:         General: Normal range of motion.      Cervical back: Normal range of motion and neck supple.   Skin:     General: Skin is warm and dry.      Capillary Refill: Capillary refill takes less than 2 seconds.   Neurological:      General: No focal deficit present.      Mental Status: He is alert and oriented to person, place, and time. Mental status is at baseline.   Psychiatric:         Mood and Affect: Mood normal.         Behavior: Behavior normal.         Thought Content: Thought content normal.         Judgment: Judgment normal.       Assessment/Plan:   Assessment    1. Routine screening for STI (sexually transmitted infection)  HIV AG/AB COMBO ASSAY SCREENING    RPR (SYPHILIS)      2. Dysuria  sulfamethoxazole-trimethoprim (BACTRIM DS) 800-160 MG tablet      As his symptoms have greatly  improved with 1 week of Bactrim he was placed on additional 7-day course for treatment.  I see no reason to reculture urine as urine 1 week ago was negative.  He also tested -1-week ago for gonorrhea and chlamydia.  He was also empirically treated for these infections.  Syphilis and HIV testing ordered today.  Antibiotic sent to pharmacy.  Patient to follow-up for worsening or persistent symptoms.    AVS handout given and reviewed with patient. Pt educated on red flags and when to seek treatment back in ER or UC.

## 2022-10-14 ENCOUNTER — HOSPITAL ENCOUNTER (OUTPATIENT)
Dept: LAB | Facility: MEDICAL CENTER | Age: 22
End: 2022-10-14
Attending: NURSE PRACTITIONER
Payer: MEDICARE

## 2022-10-14 DIAGNOSIS — Z11.3 ROUTINE SCREENING FOR STI (SEXUALLY TRANSMITTED INFECTION): ICD-10-CM

## 2022-10-14 LAB — HIV 1+2 AB+HIV1 P24 AG SERPL QL IA: NORMAL

## 2022-10-14 PROCEDURE — 36415 COLL VENOUS BLD VENIPUNCTURE: CPT | Mod: GY

## 2022-10-14 PROCEDURE — 86780 TREPONEMA PALLIDUM: CPT | Mod: GY

## 2022-10-14 PROCEDURE — G0475 HIV COMBINATION ASSAY: HCPCS | Mod: GY

## 2022-10-14 PROCEDURE — 86592 SYPHILIS TEST NON-TREP QUAL: CPT

## 2022-10-15 LAB — T PALLIDUM AB SER QL IA: REACTIVE

## 2022-10-15 PROCEDURE — 86780 TREPONEMA PALLIDUM: CPT

## 2022-10-15 PROCEDURE — 86592 SYPHILIS TEST NON-TREP QUAL: CPT

## 2022-10-18 LAB — RPR SER QL: NON REACTIVE

## 2022-10-19 LAB — T PALLIDUM AB SER QL AGGL: REACTIVE

## 2022-10-21 ENCOUNTER — APPOINTMENT (OUTPATIENT)
Dept: MEDICAL GROUP | Facility: CLINIC | Age: 22
End: 2022-10-21
Payer: MEDICARE

## 2022-11-02 ENCOUNTER — PATIENT MESSAGE (OUTPATIENT)
Dept: HEALTH INFORMATION MANAGEMENT | Facility: OTHER | Age: 22
End: 2022-11-02

## 2022-11-11 ENCOUNTER — APPOINTMENT (OUTPATIENT)
Dept: RADIOLOGY | Facility: IMAGING CENTER | Age: 22
End: 2022-11-11
Attending: NURSE PRACTITIONER
Payer: MEDICARE

## 2022-11-11 ENCOUNTER — OFFICE VISIT (OUTPATIENT)
Dept: URGENT CARE | Facility: CLINIC | Age: 22
End: 2022-11-11
Payer: MEDICARE

## 2022-11-11 VITALS
OXYGEN SATURATION: 98 % | TEMPERATURE: 98.5 F | SYSTOLIC BLOOD PRESSURE: 110 MMHG | HEIGHT: 72 IN | RESPIRATION RATE: 16 BRPM | HEART RATE: 97 BPM | BODY MASS INDEX: 21.48 KG/M2 | DIASTOLIC BLOOD PRESSURE: 80 MMHG | WEIGHT: 158.6 LBS

## 2022-11-11 DIAGNOSIS — R10.9 LEFT LATERAL ABDOMINAL PAIN: ICD-10-CM

## 2022-11-11 LAB
APPEARANCE UR: CLEAR
BILIRUB UR STRIP-MCNC: NEGATIVE MG/DL
COLOR UR AUTO: NORMAL
GLUCOSE UR STRIP.AUTO-MCNC: NEGATIVE MG/DL
KETONES UR STRIP.AUTO-MCNC: NORMAL MG/DL
LEUKOCYTE ESTERASE UR QL STRIP.AUTO: NEGATIVE
NITRITE UR QL STRIP.AUTO: NEGATIVE
PH UR STRIP.AUTO: 7 [PH] (ref 5–8)
PROT UR QL STRIP: NORMAL MG/DL
RBC UR QL AUTO: NEGATIVE
SP GR UR STRIP.AUTO: 1.02
UROBILINOGEN UR STRIP-MCNC: 1 MG/DL

## 2022-11-11 PROCEDURE — 99213 OFFICE O/P EST LOW 20 MIN: CPT | Performed by: NURSE PRACTITIONER

## 2022-11-11 PROCEDURE — 74019 RADEX ABDOMEN 2 VIEWS: CPT | Mod: TC | Performed by: NURSE PRACTITIONER

## 2022-11-11 PROCEDURE — 81002 URINALYSIS NONAUTO W/O SCOPE: CPT | Performed by: NURSE PRACTITIONER

## 2022-11-11 NOTE — PROGRESS NOTES
Chief Complaint   Patient presents with    Abdominal Pain     Lower left side x 4 days        HISTORY OF PRESENT ILLNESS: Patient is a pleasant 22 y.o. male who presents to urgent care today with complaints of left-sided abdominal pain.  Patient notes that for the past 4 days he has had intermittent left upper quadrant abdominal pain.  Pain worsens after eating.  He otherwise feels well denies any fever, chills, malaise.  He does have a history of constipation but feels that he has been having regular stools.  He has tried Tylenol with some improvement.  He otherwise feels well.    Patient Active Problem List    Diagnosis Date Noted    Contact w and (suspected) exposure to human immunodef virus 01/14/2022    Syphilis in male 01/14/2022    Penile lesion 12/28/2021    Well adult exam 11/19/2021    Oral thrush 11/19/2021    At risk for HIV due to homosexual contact 10/12/2021    Condyloma acuminatum 06/18/2021    Finding of above normal blood pressure 04/27/2021    High risk sexual behavior 04/27/2021    Seizure disorder (HCC) 01/26/2021    Contact dermatitis 01/26/2021    Amygdalolith 01/05/2021    Other long term (current) drug therapy 12/21/2020    Reactive lymphadenopathy 10/22/2019    Encounter for screening for infections with a predominantly sexual mode of transmission 07/01/2019    Indigestion 07/01/2019    Hypothyroidism 03/07/2018       Allergies:Latex    Current Outpatient Medications Ordered in Epic   Medication Sig Dispense Refill    imiquimod (ALDARA) 5 % cream Apply 1 Packet topically two times a week. (Patient not taking: Reported on 10/12/2022) 32 Each 3     No current Epic-ordered facility-administered medications on file.       Past Medical History:   Diagnosis Date    Psychiatric disorder     ADHD    Seizure disorder (HCC)        Social History     Tobacco Use    Smoking status: Never    Smokeless tobacco: Never   Vaping Use    Vaping Use: Former   Substance Use Topics    Alcohol use: Yes      Comment: rare    Drug use: Yes     Types: Marijuana     Comment: marijuana       No family status information on file.   History reviewed. No pertinent family history.    ROS:  Review of Systems   Constitutional: Negative for fever, chills, weight loss, malaise, and fatigue.   HENT: Negative for ear pain, nosebleeds, congestion, sore throat and neck pain.    Eyes: Negative for vision changes.   Neuro: Negative for headache, sensory changes, weakness, seizure, LOC.   Cardiovascular: Negative for chest pain, palpitations, orthopnea and leg swelling.   Respiratory: Negative for cough, sputum production, shortness of breath and wheezing.   Gastrointestinal: Positive for abdominal pain.   Negative for nausea, vomiting or diarrhea.   Genitourinary: Negative for dysuria, urgency and frequency.  Musculoskeletal: Negative for falls, neck pain, back pain, joint pain, myalgias.   Skin: Negative for rash, diaphoresis.     Exam:  /80   Pulse 97   Temp 36.9 °C (98.5 °F) (Temporal)   Resp 16   Ht 1.829 m (6')   Wt 71.9 kg (158 lb 9.6 oz)   SpO2 98%   General: well-nourished, well-developed male in NAD  Head: normocephalic, atraumatic  Eyes: PERRLA, no conjunctival injection, acuity grossly intact, lids normal.  Ears: normal shape and symmetry, no tenderness, no discharge. External canals are without any significant edema or erythema. Tympanic membranes are without any inflammation, no effusion. Gross auditory acuity is intact.  Nose: symmetrical without tenderness, no discharge.  Mouth/Throat: reasonable hygiene, no erythema, exudates or tonsillar enlargement.  Neck: no masses, range of motion within normal limits, no tracheal deviation. No obvious thyroid enlargement.   Lymph: no cervical adenopathy. No supraclavicular adenopathy.   Neuro: alert and oriented. Cranial nerves 1-12 grossly intact. No sensory deficit.   Cardiovascular: regular rate and rhythm. No edema.  Pulmonary: no distress. Chest is symmetrical with  "respiration, no wheezes, crackles, or rhonchi.   Abdomen: soft, minimal left upper quadrant tenderness, no guarding, no hepatosplenomegaly.  Musculoskeletal: no clubbing, appropriate muscle tone, gait is stable.  Skin: warm, dry, intact, no clubbing, no cyanosis, no rashes.   Psych: appropriate mood, affect, judgement.       POC urine positive for ketones and protein    DX abdomen radiology reading \"No evidence of bowel obstruction.\"        Assessment/Plan:  1. Left lateral abdominal pain  POCT Urinalysis    [id number] VIEWS          Patient is a well-appearing 22-year-old who presents with left upper quadrant abdominal pain for the past 4 days.  Patient is nontoxic, appears well in clinic.  Urine is negative for signs of infectious process.  X-ray is negative for any abnormalities.  I discussed potential for gastritis versus PUD.  OTC Prilosec encouraged.  Diet considerations discussed.  Supportive care, differential diagnoses, and indications for immediate follow-up discussed with patient.   Pathogenesis of diagnosis discussed including typical length and natural progression.   Instructed to return to clinic or nearest emergency department for any change in condition, further concerns, or worsening of symptoms.  Patient states understanding of the plan of care and discharge instructions.  Instructed to make an appointment, for follow up, with his primary care provider.        Please note that this dictation was created using voice recognition software. I have made every reasonable attempt to correct obvious errors, but I expect that there are errors of grammar and possibly content that I did not discover before finalizing the note.      RANGEL Mina.     "

## 2022-12-13 ENCOUNTER — HOSPITAL ENCOUNTER (OUTPATIENT)
Dept: LAB | Facility: MEDICAL CENTER | Age: 22
End: 2022-12-13
Attending: STUDENT IN AN ORGANIZED HEALTH CARE EDUCATION/TRAINING PROGRAM
Payer: MEDICARE

## 2022-12-13 ENCOUNTER — OFFICE VISIT (OUTPATIENT)
Dept: MEDICAL GROUP | Facility: CLINIC | Age: 22
End: 2022-12-13
Payer: MEDICARE

## 2022-12-13 VITALS — WEIGHT: 158 LBS | BODY MASS INDEX: 21.4 KG/M2 | HEIGHT: 72 IN

## 2022-12-13 DIAGNOSIS — Z91.89 AT RISK FOR HIV DUE TO HOMOSEXUAL CONTACT: ICD-10-CM

## 2022-12-13 LAB — HIV 1+2 AB+HIV1 P24 AG SERPL QL IA: NORMAL

## 2022-12-13 PROCEDURE — 99213 OFFICE O/P EST LOW 20 MIN: CPT | Mod: GE | Performed by: STUDENT IN AN ORGANIZED HEALTH CARE EDUCATION/TRAINING PROGRAM

## 2022-12-13 PROCEDURE — 36415 COLL VENOUS BLD VENIPUNCTURE: CPT | Mod: GA

## 2022-12-13 PROCEDURE — G0475 HIV COMBINATION ASSAY: HCPCS | Mod: GA

## 2022-12-14 NOTE — PROGRESS NOTES
Subjective:     CC: HIV testing    HPI:   Chris presents today with:    Problem   At Risk for HIV Due to Homosexual Contact    Patient here requesting a repeat HIV test due to recent exposure.  Patient states that his male partner had an exposure to a person with HIV, but reportedly undetectable levels.  After this, patient received oral sex from his partner who had the exposure.    Patient has not yet started Truvada.  He is waiting on the results from his hepatitis B test and plans on starting after that.         Current Outpatient Medications Ordered in Epic   Medication Sig Dispense Refill    imiquimod (ALDARA) 5 % cream Apply 1 Packet topically two times a week. 32 Each 3     No current Epic-ordered facility-administered medications on file.       Objective:     Exam:  BP (P) 132/68 (BP Location: Right arm, Patient Position: Sitting, BP Cuff Size: Adult)   Pulse (P) 94   Temp (P) 37.4 °C (99.3 °F) (Temporal)   Ht 1.829 m (6')   Wt 71.7 kg (158 lb)   SpO2 (P) 97%   BMI 21.43 kg/m²  Body mass index is 21.43 kg/m².    General: Normal appearing. No distress.      Assessment & Plan:     22 y.o. male with the following -     Problem List Items Addressed This Visit       At risk for HIV due to homosexual contact     After known exposure, though low risk as patient was engaged in oral sex.  We will test for HIV.  Recommend starting Truvada after completing the hepatitis B antigen test, which was ordered earlier this year.  Patient can complete this at a [facility].         Relevant Orders    HIV AG/AB COMBO ASSAY SCREENING         No follow-ups on file.    Gillian Samano MD   PGY-3

## 2022-12-14 NOTE — ASSESSMENT & PLAN NOTE
After known exposure, though low risk as patient was engaged in oral sex.  We will test for HIV.  Recommend starting Truvada after completing the hepatitis B antigen test, which was ordered earlier this year.  Patient can complete this at a [facility].

## 2023-02-24 ENCOUNTER — APPOINTMENT (OUTPATIENT)
Dept: MEDICAL GROUP | Facility: CLINIC | Age: 23
End: 2023-02-24
Payer: MEDICARE

## 2023-03-07 ENCOUNTER — OFFICE VISIT (OUTPATIENT)
Dept: MEDICAL GROUP | Facility: CLINIC | Age: 23
End: 2023-03-07
Payer: MEDICARE

## 2023-03-07 VITALS
HEIGHT: 72 IN | DIASTOLIC BLOOD PRESSURE: 88 MMHG | SYSTOLIC BLOOD PRESSURE: 138 MMHG | TEMPERATURE: 98 F | HEART RATE: 90 BPM | BODY MASS INDEX: 21.13 KG/M2 | WEIGHT: 156 LBS | OXYGEN SATURATION: 96 % | RESPIRATION RATE: 16 BRPM

## 2023-03-07 DIAGNOSIS — Z79.899 ON PRE-EXPOSURE PROPHYLAXIS FOR HIV: ICD-10-CM

## 2023-03-07 DIAGNOSIS — L85.3 DRY SKIN: ICD-10-CM

## 2023-03-07 DIAGNOSIS — Z91.89 AT RISK FOR HIV DUE TO HOMOSEXUAL CONTACT: ICD-10-CM

## 2023-03-07 DIAGNOSIS — A63.0 CONDYLOMA ACUMINATUM: ICD-10-CM

## 2023-03-07 PROCEDURE — 99214 OFFICE O/P EST MOD 30 MIN: CPT | Mod: GC | Performed by: STUDENT IN AN ORGANIZED HEALTH CARE EDUCATION/TRAINING PROGRAM

## 2023-03-07 RX ORDER — IMIQUIMOD 12.5 MG/.25G
1 CREAM TOPICAL
Qty: 32 EACH | Refills: 3 | Status: SHIPPED | OUTPATIENT
Start: 2023-03-07 | End: 2023-08-17 | Stop reason: SDUPTHER

## 2023-03-07 NOTE — PROGRESS NOTES
HPI  Chris Murillo is a 23 y.o. male presenting for multiple concerns.    Problem   Dry Skin    Dry skin on shoulders and legs especially after showers.  Red and dry papules that appear after hot showers that resolve on their own.  Does not use cream or anything on them, but they resolve on their own.  No pruritis, no other concerns with them, they always improve on their own.     At Risk for HIV Due to Homosexual Contact    Reviewed prior 2 HIV tests which were both negative about 3 months apart, reassuring he has not contracted HIV    Patient has not yet started Truvada for PREP.  He is waiting on the results from his hepatitis B test and plans on starting after that.  Interested in new order for Hep B testing, but has Truvada at home already     Condyloma Acuminatum    -Patient has been dealing with anal warts since summer 2021  -These have been irritating to him as he feels it when he wipes, and has some itching.  -He had 2 treatments of cryotherapy which helped slightly, but only short-lived  -We started imiquimod in late 2021 and this is been slowly improving things  -He has been on this treatment for several months, but still reports improvement, so he would like a refill.    Update:  Warts still occasionally recur, no concerns at this time, but is out of refills on Imiquimod cream and requesting refill.              PM   Past Medical History:   Diagnosis Date    Psychiatric disorder     ADHD    Seizure disorder (HCC)        No past surgical history on file.    Social History     Tobacco Use    Smoking status: Never    Smokeless tobacco: Never   Substance Use Topics    Alcohol use: Yes     Comment: rare       No family history on file.      PE  /88 (BP Location: Left arm, Patient Position: Sitting, BP Cuff Size: Adult)   Pulse 90   Temp 36.7 °C (98 °F) (Temporal)   Resp 16   Ht 1.829 m (6')   Wt 70.8 kg (156 lb)   SpO2 96%   BMI 21.16 kg/m²     Physical Exam  Constitutional:       General:  He is not in acute distress.     Appearance: Normal appearance. He is not ill-appearing.   HENT:      Head: Normocephalic and atraumatic.      Nose: Nose normal.      Mouth/Throat:      Mouth: Mucous membranes are moist.      Pharynx: Oropharynx is clear.   Eyes:      Extraocular Movements: Extraocular movements intact.      Conjunctiva/sclera: Conjunctivae normal.   Cardiovascular:      Rate and Rhythm: Normal rate and regular rhythm.      Pulses: Normal pulses.      Heart sounds: Normal heart sounds. No murmur heard.  Pulmonary:      Effort: Pulmonary effort is normal. No respiratory distress.      Breath sounds: Normal breath sounds. No wheezing, rhonchi or rales.   Abdominal:      General: Abdomen is flat.   Musculoskeletal:         General: No swelling, deformity or signs of injury. Normal range of motion.      Cervical back: Normal range of motion and neck supple.   Lymphadenopathy:      Cervical: No cervical adenopathy.   Skin:     General: Skin is warm and dry.      Findings: No rash.   Neurological:      General: No focal deficit present.      Mental Status: He is alert and oriented to person, place, and time.        A/P:  Dry skin  Likely d/t hot water and dryness from climate and season  Advised monitoring since it self resolves  Can use emollients/moisturizers if these appear to help problem.    At risk for HIV due to homosexual contact  Counseled about Truvada, safe sex practices, and monitoring after starting medication  Ordered Hep B screening - if consistent with immunity from vaccination can start Truvada  After starting, followup in 3 months for lab monitoring and refills    Condyloma acuminatum  Asymptomatic presently, but requesting refill for next time needed  Refill Imiquimod cream  Followup if symptoms worsening.

## 2023-03-07 NOTE — ASSESSMENT & PLAN NOTE
Likely d/t hot water and dryness from climate and season  Advised monitoring since it self resolves  Can use emollients/moisturizers if these appear to help problem.

## 2023-03-07 NOTE — ASSESSMENT & PLAN NOTE
Counseled about Truvada, safe sex practices, and monitoring after starting medication  Ordered Hep B screening - if consistent with immunity from vaccination can start Truvada  After starting, followup in 3 months for lab monitoring and refills

## 2023-03-07 NOTE — ASSESSMENT & PLAN NOTE
Asymptomatic presently, but requesting refill for next time needed  Refill Imiquimod cream  Followup if symptoms worsening.

## 2023-03-13 ENCOUNTER — HOSPITAL ENCOUNTER (OUTPATIENT)
Dept: LAB | Facility: MEDICAL CENTER | Age: 23
End: 2023-03-13
Attending: STUDENT IN AN ORGANIZED HEALTH CARE EDUCATION/TRAINING PROGRAM
Payer: MEDICARE

## 2023-03-13 DIAGNOSIS — Z91.89 AT RISK FOR HIV DUE TO HOMOSEXUAL CONTACT: ICD-10-CM

## 2023-03-13 DIAGNOSIS — Z79.899 ON PRE-EXPOSURE PROPHYLAXIS FOR HIV: ICD-10-CM

## 2023-03-13 PROCEDURE — 86704 HEP B CORE ANTIBODY TOTAL: CPT | Mod: GA

## 2023-03-13 PROCEDURE — 36415 COLL VENOUS BLD VENIPUNCTURE: CPT | Mod: GA

## 2023-03-13 PROCEDURE — 87340 HEPATITIS B SURFACE AG IA: CPT | Mod: GA

## 2023-03-13 PROCEDURE — 86706 HEP B SURFACE ANTIBODY: CPT | Mod: GA

## 2023-03-14 LAB
HBV CORE AB SERPL QL IA: NONREACTIVE
HBV SURFACE AB SERPL IA-ACNC: 312 MIU/ML (ref 0–10)
HBV SURFACE AG SER QL: NORMAL

## 2023-03-17 NOTE — ASSESSMENT & PLAN NOTE
-Since he feels like he is getting relief, and the side effects are not overly bothersome to him, he would like to continue this treatment  -He will try to continue this for a total of 16 weeks  
Private car

## 2023-05-26 ENCOUNTER — OFFICE VISIT (OUTPATIENT)
Dept: MEDICAL GROUP | Facility: CLINIC | Age: 23
End: 2023-05-26
Payer: MEDICARE

## 2023-05-26 VITALS
SYSTOLIC BLOOD PRESSURE: 148 MMHG | WEIGHT: 165.5 LBS | HEART RATE: 100 BPM | RESPIRATION RATE: 16 BRPM | TEMPERATURE: 98.6 F | BODY MASS INDEX: 21.93 KG/M2 | HEIGHT: 73 IN | DIASTOLIC BLOOD PRESSURE: 80 MMHG | OXYGEN SATURATION: 96 %

## 2023-05-26 DIAGNOSIS — Z79.899 ON PRE-EXPOSURE PROPHYLAXIS FOR HIV: ICD-10-CM

## 2023-05-26 DIAGNOSIS — K64.0 GRADE I HEMORRHOIDS: ICD-10-CM

## 2023-05-26 DIAGNOSIS — A63.0 CONDYLOMA ACUMINATUM: ICD-10-CM

## 2023-05-26 DIAGNOSIS — Z72.53 HIGH RISK BISEXUAL BEHAVIOR: ICD-10-CM

## 2023-05-26 PROCEDURE — 3077F SYST BP >= 140 MM HG: CPT | Performed by: STUDENT IN AN ORGANIZED HEALTH CARE EDUCATION/TRAINING PROGRAM

## 2023-05-26 PROCEDURE — 3079F DIAST BP 80-89 MM HG: CPT | Performed by: STUDENT IN AN ORGANIZED HEALTH CARE EDUCATION/TRAINING PROGRAM

## 2023-05-26 PROCEDURE — 99213 OFFICE O/P EST LOW 20 MIN: CPT | Mod: GC | Performed by: STUDENT IN AN ORGANIZED HEALTH CARE EDUCATION/TRAINING PROGRAM

## 2023-05-26 NOTE — ASSESSMENT & PLAN NOTE
Unclear tissue appearance around anus.  Does appear like condyloma, but could also be hemorrhoidal tissue.  Symptoms consistent with either wart or hemorrhoid, difficult to discern based on appearance  Right at anal verge, difficult to determine involvement of sphincter itself  D/t unsure etiology, I do not feel comfortable performing cryo or biopsy on lesion in clinic  Referral placed to general surgery for further evaluation and definitive management.

## 2023-05-26 NOTE — ASSESSMENT & PLAN NOTE
Lesion around anus consistent with hemorrhoidal tissue, but unclear if this or warts are bothersome for him or what specifically the tissue is  Referral to general surgery for further evaluation and management of hemorrhoids vs anal warts

## 2023-05-26 NOTE — ASSESSMENT & PLAN NOTE
Continue Truvada  Continue safe sex practice counseling  Can refill Truvada PRN  q3mo HIV screening to monitor and verify negative

## 2023-05-26 NOTE — PROGRESS NOTES
"HPI  Chris Murillo is a 23 y.o. male presenting for followup multiple concerns.    Problem   Grade I Hemorrhoids    Reports hemorrhoids a few weeks ago and was treating these with OTC creams.  Felt he got it completely treated, but unclear if bothersome tissue is hemorrhoidal or related to anal warts.       Condyloma Acuminatum    -Patient has been dealing with anal warts since summer 2021  -These have been irritating to him as he feels it when he wipes, and has some itching.  -He had 2 treatments of cryotherapy which helped slightly, but only short-lived  -We started imiquimod in late 2021 and this is been slowly improving things until recently has plateaued    Update:  Concerned specifically for one single wart that has not responded to Imiquimod.  Interested in Cryotherapy if possible.        On Pre-Exposure Prophylaxis for HIV    Tolerating Truvada well.  Initially had minor muscle aches and throat pain, this resolved after the first week.  No further concerns or adverse effects, happy with medicine.  Open to q3mo HIV screening.                PMH   Past Medical History:   Diagnosis Date    Psychiatric disorder     ADHD    Seizure disorder (HCC)        No past surgical history on file.    Social History     Tobacco Use    Smoking status: Never    Smokeless tobacco: Never   Vaping Use    Vaping Use: Former   Substance Use Topics    Alcohol use: Yes     Comment: rare       No family history on file.      PE  BP (!) 148/80   Pulse 100   Temp 37 °C (98.6 °F) (Temporal)   Resp 16   Ht 1.854 m (6' 1\")   Wt 75.1 kg (165 lb 8 oz)   SpO2 96%   BMI 21.84 kg/m²     Physical Exam  Constitutional:       General: He is not in acute distress.     Appearance: Normal appearance. He is not ill-appearing.   HENT:      Head: Normocephalic and atraumatic.      Nose: Nose normal.      Mouth/Throat:      Mouth: Mucous membranes are moist.      Pharynx: Oropharynx is clear.   Eyes:      Extraocular Movements: Extraocular " movements intact.      Conjunctiva/sclera: Conjunctivae normal.   Cardiovascular:      Rate and Rhythm: Normal rate and regular rhythm.      Pulses: Normal pulses.      Heart sounds: Normal heart sounds. No murmur heard.  Pulmonary:      Effort: Pulmonary effort is normal. No respiratory distress.      Breath sounds: Normal breath sounds. No wheezing, rhonchi or rales.   Abdominal:      General: Abdomen is flat.   Musculoskeletal:         General: No swelling, deformity or signs of injury. Normal range of motion.      Cervical back: Normal range of motion and neck supple.   Lymphadenopathy:      Cervical: No cervical adenopathy.   Skin:     General: Skin is warm and dry.      Findings: No rash.      Comments: Multiple lesions around anus, just outside verge of sphincter. Largest is at 6:00 and has appearance of either large condyloma or hemorrhoid, considering location and size/appearance most consistent with hemorrhoid.  Nontender, no bleeding/erythema.   Neurological:      General: No focal deficit present.      Mental Status: He is alert and oriented to person, place, and time.          A/P:  On pre-exposure prophylaxis for HIV  Continue Truvada  Continue safe sex practice counseling  Can refill Truvada PRN  q3mo HIV screening to monitor and verify negative    Grade I hemorrhoids  Lesion around anus consistent with hemorrhoidal tissue, but unclear if this or warts are bothersome for him or what specifically the tissue is  Referral to general surgery for further evaluation and management of hemorrhoids vs anal warts    Condyloma acuminatum  Unclear tissue appearance around anus.  Does appear like condyloma, but could also be hemorrhoidal tissue.  Symptoms consistent with either wart or hemorrhoid, difficult to discern based on appearance  Right at anal verge, difficult to determine involvement of sphincter itself  D/t unsure etiology, I do not feel comfortable performing cryo or biopsy on lesion in  clinic  Referral placed to general surgery for further evaluation and definitive management.

## 2023-05-31 ENCOUNTER — HOSPITAL ENCOUNTER (OUTPATIENT)
Dept: LAB | Facility: MEDICAL CENTER | Age: 23
End: 2023-05-31
Attending: STUDENT IN AN ORGANIZED HEALTH CARE EDUCATION/TRAINING PROGRAM
Payer: MEDICARE

## 2023-05-31 DIAGNOSIS — Z79.899 ON PRE-EXPOSURE PROPHYLAXIS FOR HIV: ICD-10-CM

## 2023-05-31 PROCEDURE — G0475 HIV COMBINATION ASSAY: HCPCS | Mod: GA

## 2023-05-31 PROCEDURE — 36415 COLL VENOUS BLD VENIPUNCTURE: CPT | Mod: GA

## 2023-06-01 LAB — HIV 1+2 AB+HIV1 P24 AG SERPL QL IA: NORMAL

## 2023-08-17 ENCOUNTER — OFFICE VISIT (OUTPATIENT)
Dept: MEDICAL GROUP | Facility: CLINIC | Age: 23
End: 2023-08-17
Payer: MEDICARE

## 2023-08-17 VITALS
HEIGHT: 73 IN | DIASTOLIC BLOOD PRESSURE: 72 MMHG | RESPIRATION RATE: 14 BRPM | BODY MASS INDEX: 21.87 KG/M2 | TEMPERATURE: 97.3 F | HEART RATE: 82 BPM | SYSTOLIC BLOOD PRESSURE: 114 MMHG | WEIGHT: 165 LBS

## 2023-08-17 DIAGNOSIS — Z91.89 AT RISK FOR HIV DUE TO HOMOSEXUAL CONTACT: ICD-10-CM

## 2023-08-17 PROCEDURE — 3078F DIAST BP <80 MM HG: CPT | Performed by: STUDENT IN AN ORGANIZED HEALTH CARE EDUCATION/TRAINING PROGRAM

## 2023-08-17 PROCEDURE — 3074F SYST BP LT 130 MM HG: CPT | Performed by: STUDENT IN AN ORGANIZED HEALTH CARE EDUCATION/TRAINING PROGRAM

## 2023-08-17 PROCEDURE — 99213 OFFICE O/P EST LOW 20 MIN: CPT | Mod: GE | Performed by: STUDENT IN AN ORGANIZED HEALTH CARE EDUCATION/TRAINING PROGRAM

## 2023-08-17 RX ORDER — TRIAMCINOLONE ACETONIDE 40 MG/ML
40 INJECTION, SUSPENSION INTRA-ARTICULAR; INTRAMUSCULAR ONCE
Status: CANCELLED | OUTPATIENT
Start: 2023-08-17 | End: 2023-08-17

## 2023-08-17 RX ORDER — EMTRICITABINE 200 MG/1
200 CAPSULE ORAL DAILY
Qty: 30 CAPSULE | Refills: 5 | Status: SHIPPED | OUTPATIENT
Start: 2023-08-17 | End: 2024-02-29 | Stop reason: SDUPTHER

## 2023-08-17 RX ORDER — SULFAMETHOXAZOLE AND TRIMETHOPRIM 800; 160 MG/1; MG/1
1 TABLET ORAL 2 TIMES DAILY
COMMUNITY
End: 2023-08-17

## 2023-08-17 RX ORDER — EMTRICITABINE 200 MG/1
200 CAPSULE ORAL DAILY
COMMUNITY
End: 2023-08-17 | Stop reason: SDUPTHER

## 2023-08-17 RX ORDER — IMIQUIMOD 12.5 MG/.25G
1 CREAM TOPICAL
Qty: 32 EACH | Refills: 5 | Status: SHIPPED | OUTPATIENT
Start: 2023-08-17

## 2023-08-17 ASSESSMENT — PATIENT HEALTH QUESTIONNAIRE - PHQ9: CLINICAL INTERPRETATION OF PHQ2 SCORE: 0

## 2023-08-18 NOTE — ASSESSMENT & PLAN NOTE
Tolerating Truvada without any concerns, last HIV testing negative, declines STI testing today, no concerns for new STI symptoms.  -Truvada refilled

## 2023-08-18 NOTE — PROGRESS NOTES
"Subjective     Chris Murillo is a 23 y.o. male who presents with Follow-Up (Medications, )            HPI  Presents for refill of Truvada.  Tolerating medication well, uses for PrEP.  Declines STI testing today, denies any new STI type symptoms including genital lesions, discharge.  Denies any new partners.    ROS  A 12 point review of systems was performed with the patient with pertinent positives and negatives as above otherwise found to be within normal limits.           Objective     /72 (BP Location: Right arm, Patient Position: Sitting, BP Cuff Size: Adult)   Pulse 82   Temp 36.3 °C (97.3 °F) (Temporal)   Resp 14   Ht 1.854 m (6' 1\")   Wt 74.8 kg (165 lb)   BMI 21.77 kg/m²      Physical Exam  Constitutional:       Appearance: Normal appearance.   HENT:      Head: Normocephalic and atraumatic.   Eyes:      Extraocular Movements: Extraocular movements intact.      Pupils: Pupils are equal, round, and reactive to light.   Pulmonary:      Effort: Pulmonary effort is normal.   Musculoskeletal:      Cervical back: Normal range of motion.   Neurological:      Mental Status: He is alert.                             Assessment & Plan           Problem List Items Addressed This Visit       At risk for HIV due to homosexual contact     Tolerating Truvada without any concerns, last HIV testing negative, declines STI testing today, no concerns for new STI symptoms.  -Truvada refilled            This patient was discussed with my attending physician  Nayan Brown MD               "

## 2024-04-07 RX ORDER — EMTRICITABINE 200 MG/1
200 CAPSULE ORAL DAILY
Qty: 30 CAPSULE | Refills: 0 | Status: SHIPPED | OUTPATIENT
Start: 2024-04-07 | End: 2024-04-18 | Stop reason: SDUPTHER

## 2024-04-09 NOTE — TELEPHONE ENCOUNTER
Received request via: Pharmacy    Was the patient seen in the last year in this department? Yes    Does the patient have an active prescription (recently filled or refills available) for medication(s) requested? No    Pharm[facility] Name: [facility]    Does the patient have halfway Plus and need 100 day supply (blood pressure, diabetes and cholesterol meds only)? Patient does not have SCP

## 2024-04-10 RX ORDER — EMTRICITABINE 200 MG/1
200 CAPSULE ORAL DAILY
Qty: 30 CAPSULE | Refills: 0 | OUTPATIENT
Start: 2024-04-10

## 2024-04-18 ENCOUNTER — OFFICE VISIT (OUTPATIENT)
Dept: MEDICAL GROUP | Facility: CLINIC | Age: 24
End: 2024-04-18

## 2024-04-18 VITALS
HEART RATE: 84 BPM | DIASTOLIC BLOOD PRESSURE: 81 MMHG | BODY MASS INDEX: 23.67 KG/M2 | SYSTOLIC BLOOD PRESSURE: 130 MMHG | WEIGHT: 179.4 LBS | TEMPERATURE: 96.8 F | OXYGEN SATURATION: 95 %

## 2024-04-18 DIAGNOSIS — Z20.6 HIV EXPOSURE: ICD-10-CM

## 2024-04-18 DIAGNOSIS — Z79.899 ON PRE-EXPOSURE PROPHYLAXIS FOR HIV: ICD-10-CM

## 2024-04-18 PROCEDURE — 99213 OFFICE O/P EST LOW 20 MIN: CPT | Mod: GE | Performed by: STUDENT IN AN ORGANIZED HEALTH CARE EDUCATION/TRAINING PROGRAM

## 2024-04-18 RX ORDER — EMTRICITABINE 200 MG/1
200 CAPSULE ORAL DAILY
Qty: 90 CAPSULE | Refills: 3 | Status: SHIPPED | OUTPATIENT
Start: 2024-04-18

## 2024-04-18 ASSESSMENT — PATIENT HEALTH QUESTIONNAIRE - PHQ9: CLINICAL INTERPRETATION OF PHQ2 SCORE: 0

## 2024-04-18 NOTE — ASSESSMENT & PLAN NOTE
Continues tolerating Truvada well, no new concerning symptoms genitourinary or systemic.  Will order labs as below, can continue Truvada for now, will discuss if any lab abnormalities show up.  -Lab tests: HIV, gonorrhea, chlamydia, syphilis, CMP, lipid profile.  Source for PrEP monitoring/guidelines: https://www.cdc.gov/hiv/pdf/risk/prep/cdc-hiv-prep-guidelines-2021.pdf

## 2024-04-18 NOTE — PROGRESS NOTES
Subjective     Chris Murillo is a 24 y.o. male who presents with Medication Refill (Medication refill)            HPI  Presents for refill of PrEP medication    -Current STI symptoms: None  -Continues tolerating Truvada well.    ROS  A 12 point review of systems was performed with the patient with pertinent positives and negatives as above otherwise found to be within normal limits.           Objective     /81 (BP Location: Right arm, Patient Position: Sitting, BP Cuff Size: Adult)   Pulse 84   Temp 36 °C (96.8 °F) (Temporal)   Wt 81.4 kg (179 lb 6.4 oz)   SpO2 95%   BMI 23.67 kg/m²      Physical Exam  General: No acute distress, pleasant  Cardiopulmonary: Conversational on room air, no increased work of breathing, extremities are well-perfused  Abdomen is nondistended                      Assessment & Plan        Problem List Items Addressed This Visit       On pre-exposure prophylaxis for HIV     Continues tolerating Truvada well, no new concerning symptoms genitourinary or systemic.  Will order labs as below, can continue Truvada for now, will discuss if any lab abnormalities show up.  -Lab tests: HIV, gonorrhea, chlamydia, syphilis, CMP, lipid profile.  Source for PrEP monitoring/guidelines: https://www.cdc.gov/hiv/pdf/risk/prep/cdc-hiv-prep-guidelines-2021.pdf          Other Visit Diagnoses       HIV exposure        Relevant Orders    HIV AG/AB COMBO ASSAY SCREENING    Comp Metabolic Panel    Lipid Profile    Chlamydia/GC, PCR (Urine)    RPR (SYPHILIS)            This patient was discussed with/seen by my attending physician.    Nayan Brown MD

## 2024-04-26 NOTE — TELEPHONE ENCOUNTER
Received request via: Pharmacy    Was the patient seen in the last year in this department? Yes    Does the patient have an active prescription (recently filled or refills available) for medication(s) requested? No    Pharmacy Name: [facility]

## 2024-05-02 RX ORDER — EMTRICITABINE 200 MG/1
200 CAPSULE ORAL DAILY
Qty: 90 CAPSULE | Refills: 3 | OUTPATIENT
Start: 2024-05-02

## 2024-06-21 RX ORDER — EMTRICITABINE 200 MG/1
200 CAPSULE ORAL DAILY
Qty: 90 CAPSULE | Refills: 3 | Status: SHIPPED | OUTPATIENT
Start: 2024-06-21

## 2024-09-19 ENCOUNTER — APPOINTMENT (OUTPATIENT)
Dept: MEDICAL GROUP | Facility: CLINIC | Age: 24
End: 2024-09-19

## 2024-09-30 ENCOUNTER — OFFICE VISIT (OUTPATIENT)
Dept: MEDICAL GROUP | Facility: CLINIC | Age: 24
End: 2024-09-30
Payer: MEDICARE

## 2024-09-30 VITALS
BODY MASS INDEX: 24.92 KG/M2 | SYSTOLIC BLOOD PRESSURE: 122 MMHG | WEIGHT: 188 LBS | RESPIRATION RATE: 16 BRPM | TEMPERATURE: 98.5 F | HEART RATE: 87 BPM | HEIGHT: 73 IN | OXYGEN SATURATION: 94 % | DIASTOLIC BLOOD PRESSURE: 74 MMHG

## 2024-09-30 DIAGNOSIS — A63.0 CONDYLOMA ACUMINATUM: ICD-10-CM

## 2024-09-30 DIAGNOSIS — N50.89 TESTICULAR MASS: ICD-10-CM

## 2024-09-30 RX ORDER — IMIQUIMOD 12.5 MG/.25G
1 CREAM TOPICAL
Qty: 24 EACH | Refills: 0 | Status: SHIPPED | OUTPATIENT
Start: 2024-09-30

## 2024-09-30 NOTE — PROGRESS NOTES
"This note is formatted in an APSO format, for additional subjective and objective evaluation please scroll to the bottom of the note.    CC:condyloma, testicular mass    Assessment/Plan:  Problem List Items Addressed This Visit       Condyloma acuminatum     Pt requests refill of imiquimod. Uses weekly for 8 hours. Effect seems to have plateaued.  - Referral to derm         Relevant Orders    Referral to Dermatology    Testicular mass     Physical exam consistent with normal anatomy. Will order ultrasound for further workup.         Relevant Orders    [id number]      Orders Placed This Encounter    [id number]    Referral to Dermatology    imiquimod (ALDARA) 5 % cream       HISTORY OF PRESENT ILLNESS:   Pt c/o lump in his left testis first noticed it 2.5 years ago. Feels like a pea-sized lump on the back side. It hurts if he touches it. No change, no growth in that time.    Adopted so doesn't know his family hx.    Problem   Testicular Mass   Condyloma Acuminatum    Dr. Ortiz 5/23:  -Patient has been dealing with anal warts since summer 2021  -These have been irritating to him as he feels it when he wipes, and has some itching.  -He had 2 treatments of cryotherapy which helped slightly, but only short-lived  -We started imiquimod in late 2021 and this is been slowly improving things until recently has plateaued    Update:  Concerned specifically for one single wart that has not responded to Imiquimod.  Interested in Cryotherapy if possible.            Exam:    /74 (BP Location: Right arm, Patient Position: Sitting, BP Cuff Size: Large adult)   Pulse 87   Temp 36.9 °C (98.5 °F) (Temporal)   Resp 16   Ht 1.854 m (6' 1\")   Wt 85.3 kg (188 lb)   SpO2 94%   BMI 24.80 kg/m²  Body mass index is 24.8 kg/m².    Pt consented to sensitive portions of this exam.    Gen: Well appearing. No apparent distress. Well developed. Sitting comfortably on exam table  HEENT: NCAT, MMM  Neck: Supple, " FROM  Chest: No deformities, Equal chest expansion  Lungs: Normal effort, CTA bilaterally.  Abd: Non-distended.   Ext: No cyanosis. No edema.  : Left testis with normal epididymis palpated. No significant tenderness, no mass appreciated.  GI: Small anal wart noted on physical examination.  Skin: Warm/dry. No visible rashes.  Neuro: Non-focal. A&Ox4.  Psych: Normal behavior, normal affect    Aram Singleton MD  [facility]

## 2024-10-01 NOTE — ASSESSMENT & PLAN NOTE
Pt requests refill of imiquimod. Uses weekly for 8 hours. Effect seems to have plateaued.  - Referral to derm

## 2024-11-14 ENCOUNTER — APPOINTMENT (OUTPATIENT)
Dept: MEDICAL GROUP | Facility: CLINIC | Age: 24
End: 2024-11-14
Payer: MEDICARE

## 2024-11-14 VITALS
DIASTOLIC BLOOD PRESSURE: 78 MMHG | HEIGHT: 73 IN | SYSTOLIC BLOOD PRESSURE: 117 MMHG | TEMPERATURE: 98.4 F | HEART RATE: 84 BPM | OXYGEN SATURATION: 95 % | WEIGHT: 190 LBS | BODY MASS INDEX: 25.18 KG/M2

## 2024-11-14 DIAGNOSIS — A63.0 CONDYLOMA ACUMINATUM: ICD-10-CM

## 2024-11-14 PROCEDURE — 99213 OFFICE O/P EST LOW 20 MIN: CPT

## 2024-11-14 PROCEDURE — 3074F SYST BP LT 130 MM HG: CPT

## 2024-11-14 PROCEDURE — 3078F DIAST BP <80 MM HG: CPT

## 2024-11-14 RX ORDER — EMTRICITABINE AND TENOFOVIR ALAFENAMIDE 200; 25 MG/1; MG/1
1 TABLET ORAL DAILY
COMMUNITY

## 2024-11-14 RX ORDER — IMIQUIMOD 12.5 MG/.25G
1 CREAM TOPICAL
Qty: 24 EACH | Refills: 2 | Status: SHIPPED | OUTPATIENT
Start: 2024-11-14

## 2024-11-14 NOTE — PROGRESS NOTES
"This note is formatted in an APSO format, for additional subjective and objective evaluation please scroll to the bottom of the note.    CC: med refill    Assessment/Plan:  Problem List Items Addressed This Visit       Condyloma acuminatum     Pt was unable to get into derm because they only take his insurance in [place]. He will return to [facility] for treatment. Until then, he requests refill of imiquimod.  - Refilling imiquimod 5% 1 packet qweekly           Orders Placed This Encounter    imiquimod (ALDARA) 5 % cream    emtricitabine-TAF (DESCOVY) 200-25 mg Tab tablet       HISTORY OF PRESENT ILLNESS:     11/24/24 4pm testicular ultrasound is scheduled per patient.  Patient obtains Descovy from an outside medical center    Problem   Condyloma Acuminatum    -Patient has been dealing with anal warts since summer 2021  -He has had some response to cryotherapy and to imiquimod.           Exam:    /78 (BP Location: Left arm, Patient Position: Sitting, BP Cuff Size: Adult)   Pulse 84   Temp 36.9 °C (98.4 °F)   Ht 1.854 m (6' 1\")   Wt 86.2 kg (190 lb)   SpO2 95%   BMI 25.07 kg/m²  Body mass index is 25.07 kg/m².    Gen: Well appearing. No apparent distress. Well developed. Sitting comfortably on exam table  HEENT: NCAT, MMM  Neck: Supple, FROM  Chest: No deformities, Equal chest expansion  Lungs: Normal effort, CTA bilaterally.  CV: Regular rate and rhythm. Pulse palpable. No murmur  Abd: Non-distended.   Ext: No cyanosis. No edema.  Skin: Warm/dry. No visible rashes.  Neuro: Non-focal. A&Ox4.  Psych: Normal behavior, normal affect    Return if symptoms worsen or fail to improve.    Aram Singleton MD  [facility]    "

## 2024-11-15 NOTE — ASSESSMENT & PLAN NOTE
Pt was unable to get into derm because they only take his insurance in [place]. He will return to [facility] for treatment. Until then, he requests refill of imiquimod.  - Refilling imiquimod 5% 1 packet qweekly

## 2024-12-04 RX ORDER — IMIQUIMOD 12.5 MG/.25G
1 CREAM TOPICAL
Qty: 24 EACH | Refills: 2 | Status: SHIPPED | OUTPATIENT
Start: 2024-12-04

## 2024-12-04 NOTE — TELEPHONE ENCOUNTER
Received request via: Pharmacy    Was the patient seen in the last year in this department? Yes    Does the patient have an active prescription (recently filled or refills available) for medication(s) requested? No    Pharmacy Name: [facility]    Does the patient have snf Plus and need 100-day supply? (This applies to ALL medications) Patient does not have SCP

## 2025-01-15 ENCOUNTER — APPOINTMENT (OUTPATIENT)
Dept: MEDICAL GROUP | Facility: CLINIC | Age: 25
End: 2025-01-15
Payer: MEDICARE

## 2025-03-06 ENCOUNTER — HOSPITAL ENCOUNTER (OUTPATIENT)
Facility: MEDICAL CENTER | Age: 25
End: 2025-03-06
Payer: MEDICARE

## 2025-03-06 ENCOUNTER — APPOINTMENT (OUTPATIENT)
Dept: MEDICAL GROUP | Facility: CLINIC | Age: 25
End: 2025-03-06
Payer: MEDICAID

## 2025-03-06 VITALS
WEIGHT: 200 LBS | HEART RATE: 110 BPM | BODY MASS INDEX: 26.51 KG/M2 | RESPIRATION RATE: 16 BRPM | DIASTOLIC BLOOD PRESSURE: 72 MMHG | HEIGHT: 73 IN | SYSTOLIC BLOOD PRESSURE: 124 MMHG | OXYGEN SATURATION: 95 % | TEMPERATURE: 98.7 F

## 2025-03-06 DIAGNOSIS — E66.3 OVERWEIGHT (BMI 25.0-29.9): ICD-10-CM

## 2025-03-06 DIAGNOSIS — Z11.3 SCREENING EXAMINATION FOR STI: ICD-10-CM

## 2025-03-06 DIAGNOSIS — G40.909 SEIZURE DISORDER (HCC): ICD-10-CM

## 2025-03-06 DIAGNOSIS — Z79.899 ON PRE-EXPOSURE PROPHYLAXIS FOR HIV: ICD-10-CM

## 2025-03-06 DIAGNOSIS — E03.9 HYPOTHYROIDISM, UNSPECIFIED TYPE: ICD-10-CM

## 2025-03-06 DIAGNOSIS — N50.89 TESTICULAR MASS: ICD-10-CM

## 2025-03-06 LAB
ALBUMIN SERPL BCP-MCNC: 4.7 G/DL (ref 3.2–4.9)
ALBUMIN/GLOB SERPL: 1.7 G/DL
ALP SERPL-CCNC: 71 U/L (ref 30–99)
ALT SERPL-CCNC: 34 U/L (ref 2–50)
ANION GAP SERPL CALC-SCNC: 14 MMOL/L (ref 7–16)
AST SERPL-CCNC: 36 U/L (ref 12–45)
BILIRUB SERPL-MCNC: 0.5 MG/DL (ref 0.1–1.5)
BUN SERPL-MCNC: 9 MG/DL (ref 8–22)
CALCIUM ALBUM COR SERPL-MCNC: 9.2 MG/DL (ref 8.5–10.5)
CALCIUM SERPL-MCNC: 9.8 MG/DL (ref 8.5–10.5)
CHLORIDE SERPL-SCNC: 103 MMOL/L (ref 96–112)
CHOLEST SERPL-MCNC: 214 MG/DL (ref 100–199)
CO2 SERPL-SCNC: 22 MMOL/L (ref 20–33)
CREAT SERPL-MCNC: 0.92 MG/DL (ref 0.5–1.4)
GFR SERPLBLD CREATININE-BSD FMLA CKD-EPI: 118 ML/MIN/1.73 M 2
GLOBULIN SER CALC-MCNC: 2.8 G/DL (ref 1.9–3.5)
GLUCOSE SERPL-MCNC: 85 MG/DL (ref 65–99)
HDLC SERPL-MCNC: 50 MG/DL
LDLC SERPL CALC-MCNC: 131 MG/DL
POTASSIUM SERPL-SCNC: 4.1 MMOL/L (ref 3.6–5.5)
PROT SERPL-MCNC: 7.5 G/DL (ref 6–8.2)
SODIUM SERPL-SCNC: 139 MMOL/L (ref 135–145)
T PALLIDUM AB SER QL IA: REACTIVE
TRIGL SERPL-MCNC: 167 MG/DL (ref 0–149)
TSH SERPL DL<=0.005 MIU/L-ACNC: 1.51 UIU/ML (ref 0.38–5.33)

## 2025-03-06 PROCEDURE — 3074F SYST BP LT 130 MM HG: CPT

## 2025-03-06 PROCEDURE — 84443 ASSAY THYROID STIM HORMONE: CPT

## 2025-03-06 PROCEDURE — 83695 ASSAY OF LIPOPROTEIN(A): CPT

## 2025-03-06 PROCEDURE — 36415 COLL VENOUS BLD VENIPUNCTURE: CPT

## 2025-03-06 PROCEDURE — 86780 TREPONEMA PALLIDUM: CPT

## 2025-03-06 PROCEDURE — 3078F DIAST BP <80 MM HG: CPT

## 2025-03-06 PROCEDURE — 80061 LIPID PANEL: CPT

## 2025-03-06 PROCEDURE — 86592 SYPHILIS TEST NON-TREP QUAL: CPT | Mod: GZ

## 2025-03-06 PROCEDURE — 1126F AMNT PAIN NOTED NONE PRSNT: CPT

## 2025-03-06 PROCEDURE — 99213 OFFICE O/P EST LOW 20 MIN: CPT

## 2025-03-06 PROCEDURE — 80053 COMPREHEN METABOLIC PANEL: CPT

## 2025-03-06 PROCEDURE — 86780 TREPONEMA PALLIDUM: CPT | Mod: GZ

## 2025-03-06 ASSESSMENT — PATIENT HEALTH QUESTIONNAIRE - PHQ9
5. POOR APPETITE OR OVEREATING: 0 - NOT AT ALL
CLINICAL INTERPRETATION OF PHQ2 SCORE: 2
SUM OF ALL RESPONSES TO PHQ QUESTIONS 1-9: 4

## 2025-03-06 ASSESSMENT — PAIN SCALES - GENERAL: PAINLEVEL_OUTOF10: NO PAIN

## 2025-03-06 NOTE — PROGRESS NOTES
This note is formatted in an APSO format, for additional subjective and objective evaluation please scroll to the bottom of the note.    Check up, getting info for new pt    CC:    Chief Complaint   Patient presents with    Follow-Up     Scrotum US done 2 months ago, no results in system (done at [facility])   General questions about health        Assessment & Plan:   25 y.o. male with the following -    Problem List Items Addressed This Visit       Hypothyroidism    Relevant Orders    TSH WITH REFLEX TO FT4    Seizure disorder (HCC)    No seizures since he was in 4th grade. Off meds since 16 y/o         Testicular mass    Ultrasound reviewed, normal.          Other Visit Diagnoses         Overweight (BMI 25.0-29.9)        Relevant Orders    Comp Metabolic Panel    Lipid Profile    Lipoprotein (a)    TSH WITH REFLEX TO FT4      Screening examination for STI                Subjective:     HISTORY OF THE PRESENT ILLNESS: Patient is a 25 y.o. male, here to follow up on scrotal ultrasound results.        Problem   Testicular Mass    Normal ultrasound     Seizure Disorder (Hcc)    -He is a seizure disorder that was diagnosed when he was a child  -He had multiple seizures at a young age but has not had a seizure since 2009 when he was in fourth grade  -He was on antiseizure medication, but has not been on any since 2019  -He denies any neurological issues at this point  -He needs a DMV form filled out in order to get his license         --Immunizations:   Immunization History   Administered Date(s) Administered    9VHPV VACCINE 2-3 DOSE IM (GARDASIL 9) 10/12/2021, 12/02/2021, 05/16/2022    Dtap Vaccine 01/11/2002, 02/14/2002, 04/19/2002, 10/25/2002, 03/25/2004    Hepatitis A Vaccine, Ped/Adol 10/25/2002, 03/25/2004    Hepatitis B Vaccine Adolescent/Pediatric 01/11/2002, 02/14/2002, 10/25/2002    Hib Vaccine (Prp-d) - HISTORICAL DATA 01/11/2002, 02/14/2002, 04/19/2002    IPV 01/11/2002, 02/14/2002, 04/19/2002,  "03/25/2004    Influenza Vac Subunit Quad Inj (Pf) 10/09/2016    Influenza Vaccine Pediatric Split - Historical Data 12/08/2003, 12/19/2006, 11/30/2007    Influenza Vaccine Quad Inj (Pf) 12/10/2020    Influenza split virus trivalent (PF) 10/27/2011    MMR Vaccine 04/19/2002, 03/25/2004    PFIZER PURPLE CAP SARS-COV-2 VACCINATION (12+) 03/23/2021, 04/13/2021    TD Vaccine 05/16/2022    Tdap Vaccine 06/02/2010    Varicella Vaccine Live 03/25/2004, 11/30/2007           Objective:     Exam: /72   Pulse (!) 110   Temp 37.1 °C (98.7 °F) (Temporal)   Resp 16   Ht 1.854 m (6' 1\")   Wt 90.7 kg (200 lb)   SpO2 95%  Body mass index is 26.39 kg/m².    Physical Exam  Constitutional:       Appearance: Normal appearance.   HENT:      Head: Normocephalic and atraumatic.      Right Ear: Tympanic membrane and ear canal normal.      Left Ear: Tympanic membrane and ear canal normal.      Nose: Nose normal.      Mouth/Throat:      Mouth: Mucous membranes are moist.      Pharynx: Oropharynx is clear. No oropharyngeal exudate or posterior oropharyngeal erythema.   Eyes:      General: No scleral icterus.     Extraocular Movements: Extraocular movements intact.      Pupils: Pupils are equal, round, and reactive to light.   Cardiovascular:      Rate and Rhythm: Normal rate and regular rhythm.      Heart sounds: No murmur heard.  Pulmonary:      Effort: Pulmonary effort is normal. No respiratory distress.      Breath sounds: Normal breath sounds. No wheezing.   Abdominal:      General: Abdomen is flat. There is no distension.      Palpations: Abdomen is soft.      Tenderness: There is no abdominal tenderness. There is no guarding.   Musculoskeletal:         General: Normal range of motion.   Skin:     General: Skin is warm and dry.      Capillary Refill: Capillary refill takes less than 2 seconds.   Neurological:      General: No focal deficit present.      Mental Status: He is alert and oriented to person, place, and time. "   Psychiatric:         Mood and Affect: Mood normal.         Behavior: Behavior normal.       F/u to review labs

## 2025-03-08 LAB
LPA SERPL-MCNC: <6 MG/DL
RPR SER QL: NON REACTIVE

## 2025-03-09 ENCOUNTER — TELEPHONE (OUTPATIENT)
Dept: HOSPITALIST | Facility: MEDICAL CENTER | Age: 25
End: 2025-03-09
Payer: MEDICARE

## 2025-03-10 NOTE — TELEPHONE ENCOUNTER
Called patient today at his cell number on the chart. Discussed +treponemal and negative RPR. Per patient, he has a history of syphilis with complete course of treatment in the past. I expect that this is documented at [facility]. Therefore we expect the treponemal to continue to be positive due to previous exposure. The negative RPR suggests that he is negative for syphilis. However [facility] be [facility] to see whether [facility] recommend additional testing. We have scheduled an appt with Dr. Robertson on Wed of this week to discuss further.

## 2025-03-11 LAB — T PALLIDUM AB SER QL AGGL: REACTIVE

## 2025-03-12 ENCOUNTER — OFFICE VISIT (OUTPATIENT)
Dept: MEDICAL GROUP | Facility: CLINIC | Age: 25
End: 2025-03-12
Payer: MEDICARE

## 2025-03-12 VITALS
DIASTOLIC BLOOD PRESSURE: 74 MMHG | WEIGHT: 198.7 LBS | SYSTOLIC BLOOD PRESSURE: 120 MMHG | TEMPERATURE: 98 F | BODY MASS INDEX: 26.33 KG/M2 | HEIGHT: 73 IN | OXYGEN SATURATION: 95 % | RESPIRATION RATE: 16 BRPM | HEART RATE: 98 BPM

## 2025-03-12 DIAGNOSIS — A53.9 SYPHILIS IN MALE: ICD-10-CM

## 2025-03-12 DIAGNOSIS — E78.2 MIXED HYPERLIPIDEMIA: ICD-10-CM

## 2025-03-12 PROCEDURE — 3074F SYST BP LT 130 MM HG: CPT | Mod: GE

## 2025-03-12 PROCEDURE — 99213 OFFICE O/P EST LOW 20 MIN: CPT | Mod: GE

## 2025-03-12 PROCEDURE — 3078F DIAST BP <80 MM HG: CPT | Mod: GE

## 2025-03-12 NOTE — PROGRESS NOTES
Subjective:     CC: Follow up on labs    HPI:   Chris presents today for follow up on syphilis labs. He recently had a positive Treponemal test but negative RPR. Patient was diagnosed with Syphilis in 2022 and treated. He has had positive treponemal testing since then but negative RPR. He says he received 3 doses with treatment. He also spoke with [facility] who confirmed his treatment and said that his treponemal testing is expected to be positive, but if RPR is negative he does not require further treatment.     Patient also concerned about the results of his lipid panel. His total cholesterol was 214, triglycerides was 167, and LDL was 131. He says over the winter he was not eating great. He has changed his diet now, has been eating mostly salads for lunch and trying to eat more fish and lean meats. Patient is unsure about family history of hyperlipidemia as he was adopted from [place].      Social Hx:    Problem   Mixed Hyperlipidemia    Cholesterol panel on 3/6/25 results:   - cholesterol 214  - triglycerides 167  -      Syphilis in Male    Patient has positive treponemal test but consistently negative RPR. He has spoken with [facility] who confirmed that he was treated with 3 doses of Penicillin G. He is asymptomatic.            Current Outpatient Medications Ordered in Epic   Medication Sig Dispense Refill    imiquimod (ALDARA) 5 % cream Apply 1 Packet topically every 7 days. 24 Each 2    emtricitabine-TAF (DESCOVY) 200-25 mg Tab tablet Take 1 Tablet by mouth every day.       No current [facility]-ordered facility-administered medications on file.       Past Medical History:   Diagnosis Date    Psychiatric disorder     ADHD    Seizure disorder (HCC)         No past surgical history on file.     No family history on file.       ROS:  Gen: no fevers/chills, no weight loss  Pulm: no sob, no cough  CV: no chest pain, no palpitations  GI: no nausea/vomiting, no  "diarrhea  : no dysuria  MSk: no myalgias  Skin: no rash  Neuro: no headaches, no weakness      Objective:     Exam:  /74   Pulse 98   Temp 36.7 °C (98 °F) (Temporal)   Resp 16   Ht 1.854 m (6' 1\")   Wt 90.1 kg (198 lb 11.2 oz)   SpO2 95%   BMI 26.22 kg/m²  Body mass index is 26.22 kg/m².    Gen: Alert and oriented, No apparent distress.  Head:  NCAT, EOMI, sclera clear without discharge  Neck: Neck is supple without lymphadenopathy.  Lungs: Normal effort, CTA bilaterally, no wheezes, rhonchi, or rales  CV: Regular rate and rhythm. No murmurs, rubs, or gallops.  Abd:   Non-distended, soft  Ext: No clubbing, cyanosis, edema.  MSK: Unassisted gait  Derm: No lesions on exposed skin  Psych: normal mood and affect        Assessment & Plan:     25 y.o. male with the following -     Problem List Items Addressed This Visit       Syphilis in male    Patient has negative RPR, patient confirmed with health department that he does not need further treatment, as he completed treatment with 3 doses of Penicillin G in 2022.   Plan:  - Continue with routine screening, patient is on Descovy so continue with lab testing per protocol         Mixed hyperlipidemia    Patient with elevated cholesterol, he is not aware of family history as he is adopted.   Plan:   - Pt would like to treat this with diet, we discussed positive diet changes he could make including eating healthy fats, lean meats and fish, and avoiding processed or foods high in saturated fats  - Patient will make appointment in 3 months for repeat labs              Follow-up: In 3 months    Yola Robertson DO  PGY-1 Family Medicine Resident  [facility]          "

## 2025-03-12 NOTE — ASSESSMENT & PLAN NOTE
Patient has negative RPR, patient confirmed with health department that he does not need further treatment, as he completed treatment with 3 doses of Penicillin G in 2022.   Plan:  - Continue with routine screening, patient is on Descovy so continue with lab testing per protocol

## 2025-03-12 NOTE — ASSESSMENT & PLAN NOTE
Patient with elevated cholesterol, he is not aware of family history as he is adopted.   Plan:   - Pt would like to treat this with diet, we discussed positive diet changes he could make including eating healthy fats, lean meats and fish, and avoiding processed or foods high in saturated fats  - Patient will make appointment in 3 months for repeat labs

## 2025-04-08 ENCOUNTER — APPOINTMENT (OUTPATIENT)
Dept: MEDICAL GROUP | Facility: CLINIC | Age: 25
End: 2025-04-08
Payer: MEDICARE

## 2025-04-17 ENCOUNTER — APPOINTMENT (OUTPATIENT)
Dept: MEDICAL GROUP | Facility: CLINIC | Age: 25
End: 2025-04-17
Payer: MEDICARE

## 2025-04-30 ENCOUNTER — OFFICE VISIT (OUTPATIENT)
Dept: MEDICAL GROUP | Facility: CLINIC | Age: 25
End: 2025-04-30
Payer: MEDICARE

## 2025-04-30 VITALS
TEMPERATURE: 98.3 F | OXYGEN SATURATION: 96 % | HEIGHT: 73 IN | BODY MASS INDEX: 25.58 KG/M2 | HEART RATE: 81 BPM | SYSTOLIC BLOOD PRESSURE: 137 MMHG | WEIGHT: 193 LBS | DIASTOLIC BLOOD PRESSURE: 94 MMHG | RESPIRATION RATE: 12 BRPM

## 2025-04-30 DIAGNOSIS — I10 PRIMARY HYPERTENSION: ICD-10-CM

## 2025-04-30 DIAGNOSIS — E78.2 MIXED HYPERLIPIDEMIA: ICD-10-CM

## 2025-04-30 RX ORDER — IMIQUIMOD 12.5 MG/.25G
1 CREAM TOPICAL
Qty: 24 EACH | Refills: 4 | Status: SHIPPED | OUTPATIENT
Start: 2025-04-30

## 2025-04-30 ASSESSMENT — ENCOUNTER SYMPTOMS
BLOOD IN STOOL: 0
SEIZURES: 0
ABDOMINAL PAIN: 0
CONSTIPATION: 0
FEVER: 0
DIARRHEA: 0
NAUSEA: 0
VOMITING: 0

## 2025-04-30 NOTE — ASSESSMENT & PLAN NOTE
Mild stage 1 htn, 130's systolic today  - pt recently made lifestyle improvements. continue  - no meds indicated

## 2025-04-30 NOTE — ASSESSMENT & PLAN NOTE
Pt started vegan diet during the week (cheating weekends) 6 weeks ago. Constipation resolved with new diet.  - recheck lipids given new changes  - f/u prn

## 2025-04-30 NOTE — PROGRESS NOTES
"This note is formatted in an APSO format, for additional subjective and objective evaluation please scroll to the bottom of the note.    CC:   Chief Complaint   Patient presents with    Medication Refill    Requesting Labs     Assessment/Plan:  Problem List Items Addressed This Visit       Mixed hyperlipidemia    Pt started vegan diet during the week (cheating weekends) 6 weeks ago. Constipation resolved with new diet.  - recheck lipids given new changes  - f/u prn         Relevant Orders    Lipid Profile    APOLIPOPROTEIN B    Primary hypertension    Mild stage 1 htn, 130's systolic today  - pt recently made lifestyle improvements. 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    Eating vegan about 6 weeks ago.    Problem   Primary Hypertension   Mixed Hyperlipidemia    Cholesterol panel on 3/6/25 results:   - cholesterol 214  - triglycerides 167  -        Review of Systems   Constitutional:  Negative for fever.   Gastrointestinal:  Negative for abdominal pain, blood in stool, constipation, diarrhea, melena, nausea and vomiting.   Genitourinary:  Negative for dysuria.   Neurological:  Negative for seizures (none since he was a child, no meds).       Exam:    Resp 12   Ht 1.854 m (6' 1\")   Wt 87.5 kg (193 lb)   BMI 25.46 kg/m²  Body mass index is 25.46 kg/m².    Gen: Well appearing. No apparent distress. Well developed. Sitting comfortably on exam table  HEENT: NCAT, MMM  Neck: Supple, FROM  Chest: No deformities, Equal chest expansion  Lungs: Normal effort, CTA bilaterally.  CV: Regular rate and rhythm. Pulse palpable. No murmur  Abd: Non-distended. Flat, soft. Mild ttp diffusely. No hepatomegaly noted on percussion. No guarding or rebound.  Ext: No cyanosis. No edema.  Skin: Warm/dry. No visible rashes.  Neuro: Non-focal. A&Ox4.  Psych: " Normal behavior, normal affect    Return in about 6 months (around 10/30/2025).    Aram Singleton MD  [facility]

## 2025-06-09 ENCOUNTER — APPOINTMENT (OUTPATIENT)
Dept: MEDICAL GROUP | Facility: CLINIC | Age: 25
End: 2025-06-09
Payer: MEDICARE

## 2025-06-30 ENCOUNTER — APPOINTMENT (OUTPATIENT)
Dept: MEDICAL GROUP | Facility: CLINIC | Age: 25
End: 2025-06-30
Payer: MEDICARE

## 2025-08-10 ENCOUNTER — PATIENT MESSAGE (OUTPATIENT)
Dept: MEDICAL GROUP | Facility: CLINIC | Age: 25
End: 2025-08-10
Payer: MEDICARE

## 2025-08-11 ENCOUNTER — APPOINTMENT (OUTPATIENT)
Dept: MEDICAL GROUP | Facility: CLINIC | Age: 25
End: 2025-08-11
Payer: MEDICARE

## 2025-08-11 DIAGNOSIS — Z13.79 GENETIC SCREENING: Primary | ICD-10-CM

## 2025-08-11 DIAGNOSIS — E78.2 MIXED HYPERLIPIDEMIA: ICD-10-CM

## 2025-08-11 DIAGNOSIS — I10 PRIMARY HYPERTENSION: ICD-10-CM

## 2025-08-12 ENCOUNTER — TELEMEDICINE (OUTPATIENT)
Dept: MEDICAL GROUP | Facility: CLINIC | Age: 25
End: 2025-08-12
Payer: MEDICARE

## 2025-08-12 VITALS — BODY MASS INDEX: 24.52 KG/M2 | HEIGHT: 73 IN | WEIGHT: 185 LBS

## 2025-08-12 DIAGNOSIS — A63.0 CONDYLOMA ACUMINATUM: Primary | ICD-10-CM

## 2025-08-12 PROCEDURE — 99213 OFFICE O/P EST LOW 20 MIN: CPT | Mod: 95,GE

## 2025-08-12 RX ORDER — IMIQUIMOD 12.5 MG/.25G
1 CREAM TOPICAL
Qty: 24 EACH | Refills: 4 | Status: SHIPPED | OUTPATIENT
Start: 2025-08-12

## 2025-08-17 DIAGNOSIS — Z13.79 GENETIC SCREENING: Primary | ICD-10-CM
